# Patient Record
Sex: MALE | Race: WHITE
[De-identification: names, ages, dates, MRNs, and addresses within clinical notes are randomized per-mention and may not be internally consistent; named-entity substitution may affect disease eponyms.]

---

## 2018-05-06 ENCOUNTER — HOSPITAL ENCOUNTER (INPATIENT)
Dept: HOSPITAL 36 - ER | Age: 68
LOS: 16 days | Discharge: NURSING, REHAB OR LONG-TERM CARE | DRG: 885 | End: 2018-05-22
Attending: PSYCHIATRY & NEUROLOGY | Admitting: PSYCHIATRY & NEUROLOGY
Payer: MEDICARE

## 2018-05-06 VITALS — DIASTOLIC BLOOD PRESSURE: 76 MMHG | SYSTOLIC BLOOD PRESSURE: 124 MMHG

## 2018-05-06 DIAGNOSIS — I10: ICD-10-CM

## 2018-05-06 DIAGNOSIS — F79: ICD-10-CM

## 2018-05-06 DIAGNOSIS — E87.6: ICD-10-CM

## 2018-05-06 DIAGNOSIS — Z91.81: ICD-10-CM

## 2018-05-06 DIAGNOSIS — E03.9: ICD-10-CM

## 2018-05-06 DIAGNOSIS — F02.80: ICD-10-CM

## 2018-05-06 DIAGNOSIS — F29: Primary | ICD-10-CM

## 2018-05-06 DIAGNOSIS — G30.9: ICD-10-CM

## 2018-05-06 DIAGNOSIS — M19.90: ICD-10-CM

## 2018-05-06 LAB
ALBUMIN SERPL-MCNC: 4.1 GM/DL (ref 4.2–5.5)
ALBUMIN/GLOB SERPL: 1.6 {RATIO} (ref 1–1.8)
ALP SERPL-CCNC: 112 U/L (ref 34–104)
ALT SERPL-CCNC: 12 U/L (ref 7–52)
ANION GAP SERPL CALC-SCNC: 12.9 MMOL/L (ref 7–16)
APPEARANCE UR: CLEAR
AST SERPL-CCNC: 12 U/L (ref 13–39)
BACTERIA #/AREA URNS HPF: (no result) /HPF
BASOPHILS # BLD AUTO: 0.1 TH/CUMM (ref 0–0.2)
BASOPHILS NFR BLD AUTO: 0.6 % (ref 0–2)
BILIRUB SERPL-MCNC: 0.5 MG/DL (ref 0.3–1)
BILIRUB UR-MCNC: NEGATIVE MG/DL
BUN SERPL-MCNC: 13 MG/DL (ref 7–25)
CALCIUM SERPL-MCNC: 9.2 MG/DL (ref 8.6–10.3)
CHLORIDE SERPL-SCNC: 100 MEQ/L (ref 98–107)
CO2 SERPL-SCNC: 21.4 MEQ/L (ref 21–31)
COLOR UR: (no result)
CREAT SERPL-MCNC: 0.9 MG/DL (ref 0.7–1.3)
EOSINOPHIL # BLD AUTO: 0.1 TH/CMM (ref 0.1–0.4)
EOSINOPHIL NFR BLD AUTO: 0.9 % (ref 0–5)
EPI CELLS URNS QL MICRO: (no result) /LPF
ERYTHROCYTE [DISTWIDTH] IN BLOOD BY AUTOMATED COUNT: 12.9 % (ref 11.5–20)
GLOBULIN SER-MCNC: 2.5 GM/DL
GLUCOSE SERPL-MCNC: 107 MG/DL (ref 70–105)
GLUCOSE UR STRIP-MCNC: NEGATIVE MG/DL
HCT VFR BLD CALC: 43.1 % (ref 41–60)
HGB BLD-MCNC: 14.5 GM/DL (ref 12–16)
INR PPP: 0.95 (ref 0.5–1.4)
KETONES UR STRIP-MCNC: NEGATIVE MG/DL
LEUKOCYTE ESTERASE UR-ACNC: NEGATIVE
LYMPHOCYTE AB SER FC-ACNC: 2.2 TH/CMM (ref 1.5–3)
LYMPHOCYTES NFR BLD AUTO: 22 % (ref 20–50)
MCH RBC QN AUTO: 29.4 PG (ref 27–31)
MCHC RBC AUTO-ENTMCNC: 33.6 PG (ref 28–36)
MCV RBC AUTO: 87.6 FL (ref 80–99)
MICRO URNS: YES
MONOCYTES # BLD AUTO: 0.7 TH/CMM (ref 0.3–1)
MONOCYTES NFR BLD AUTO: 6.5 % (ref 2–10)
NEUTROPHILS # BLD: 7.1 TH/CMM (ref 1.8–8)
NEUTROPHILS NFR BLD AUTO: 70 % (ref 40–80)
NITRITE UR QL STRIP: NEGATIVE
PH UR STRIP: 6 [PH] (ref 4.6–8)
PLATELET # BLD: 339 TH/CMM (ref 150–400)
PMV BLD AUTO: 8 FL
POTASSIUM SERPL-SCNC: 3.3 MEQ/L (ref 3.5–5.1)
PROT UR STRIP-MCNC: NEGATIVE MG/DL
PROTHROMBIN TIME: 9.9 SECONDS (ref 9.5–11.5)
RBC # BLD AUTO: 4.92 MIL/CMM (ref 3.8–5.8)
RBC # UR STRIP: (no result) /UL
RBC #/AREA URNS HPF: (no result) /HPF (ref 0–5)
SODIUM SERPL-SCNC: 131 MEQ/L (ref 136–145)
SP GR UR STRIP: <= 1.005 (ref 1–1.03)
URINALYSIS COMPLETE PNL UR: (no result)
UROBILINOGEN UR STRIP-ACNC: 0.2 E.U./DL (ref 0.2–1)
WBC # BLD AUTO: 10.2 TH/CMM (ref 4.8–10.8)
WBC #/AREA URNS HPF: (no result) /HPF (ref 0–5)

## 2018-05-06 PROCEDURE — Z7610: HCPCS

## 2018-05-06 PROCEDURE — G0410 GRP PSYCH PARTIAL HOSP 45-50: HCPCS

## 2018-05-07 RX ADMIN — FEXOFENADINE HYDROCHLORIDE SCH: 60 TABLET, FILM COATED ORAL at 22:38

## 2018-05-07 RX ADMIN — FLUTICASONE PROPIONATE SCH: 50 SPRAY, METERED NASAL at 09:35

## 2018-05-07 RX ADMIN — LEVOTHYROXINE SODIUM SCH MG: 75 TABLET ORAL at 06:40

## 2018-05-07 NOTE — HISTORY & PHYSICAL
ADMIT DATE:  05/07/2018



HISTORY SOURCE:  Reviewing the chart, talking to the staff.



HISTORY OF PRESENT ILLNESS:  A 68-year-old male admitted to Geropsych Unit at

[facility] after the patient was noted to have increasing

agitation, acute in onset.  The patient was also hitting his head with his

hands.  The patient did not have any apparent injury.  Does have underlying

diagnosis of dementia.  The patient was worked up in the ER and now subsequently

admitted to the hospital for further care.



PAST MEDICAL HISTORY:  Remarkable for:

1.  Alzheimer dementia.

2.  Allergic rhinosinusitis.

3.  Hypothyroidism.

4.  DJD.

5.  Hypertension.

6.  Psychotic disorder.

7.  History of anemia.



MEDICATIONS AT HOME:  He is taking p.r.n. albuterol inhaler, Norvasc, Cogentin,

ferrous sulfate, Allegra, Flonase, Robitussin, Motrin, Synthroid, Ativan, milk

of magnesia, multivitamin, Seroquel, Diovan, and Ambien.



ALLERGIES:  The patient is not allergic to medication.



SOCIAL HISTORY:  The patient resides in assisted living facility.  The patient

has no history of any smoking cigarette, alcohol or drug use.



FAMILY MEDICAL HISTORY:  Unknown.



REVIEW OF SYSTEMS:  Unable to get meaningful history from the patient.



PHYSICAL EXAMINATION:

GENERAL:  A 68-year-old alert, awake, lying in the bed, unable to give any

meaningful history.

VITAL SIGNS:  Temperature 98, pulse is 79, respiratory rate is 18, blood

pressure 123/73.

HEENT:  Normocephalic, atraumatic.  Extraocular muscles are intact.  Tongue was

pink and coated.  No oral lesions.  No exudate.

NECK:  Supple, no JVD, no hepatojugular reflex.  No thyromegaly.  No carotid

bruit.

HEART:  Both heart sounds are regular.  No S3, no S4.

CHEST AND LUNGS:  Equal in expansion, no wheezing, no crackles.

ABDOMEN:  Soft.  No guarding, no rigidity.  Bowel sounds present.  No palpable

mass.

EXTREMITIES:  No edema, no cyanosis, no calf tenderness.  Diffuse osteoarthritic

changes noted.

NEUROLOGIC:  Alert, awake, but not following any commands.



AVAILABLE DIAGNOSTIC DATA:  White count of 10.2, hemoglobin of 14.5, platelet

count of 339.  PT, PTT are normal.  Sodium 131, potassium 3.3, chloride 100, CO2

21.4, BUN and creatinine is 13 and 0.9.  Urinalysis is unremarkable.  EKG has

underlying left bundle branch block.



CLINICAL IMPRESSION:

1.  Acute exacerbation of psychotic disorder.

2.  Hypertension.

3.  Hypothyroidism.

4.  DJD.

5.  Dementia.

6.  Hypokalemia.

7.  High risk for fall.



PLAN:  [facility]  Geriatric psychiatric care

as per Dr. Coyle.  Resume the patient's medication for hypertension, Diovan and

Norvasc.  Continue Synthroid for hypothyroidism.  Continue to provide

symptomatic care with inhalation therapy p.r.n., Motrin for the pain, cough

syrup for cough along with nutritional support with multivitamin.  Fall

precautions will be given as well.  General nursing care will be provided. 

Nutritional support will be added.  Follow up lab will be done as well and will

continue to follow this patient [facility].



I sincerely thank you, Dr. Coyle for giving me the opportunity to participate in

the patient of yours.





DD: 05/07/2018 11:15

DT: 05/07/2018 12:22

JOB# [id number]  [id number]

## 2018-05-07 NOTE — PSYCHOSOCIAL EVALUATION
DATE OF SERVICE:  



PSYCHIATRIC INITIAL EVALUATION AND MENTAL STATUS EXAM



PATIENT'S AGE:  68.



SEX:  Male.



PHYSICIAN:  Abner Coyle MD, MPH.



CHIEF COMPLAINT:  Agitation and irritability.



HISTORY OF PRESENT ILLNESS:  The patient is a 68-year-old male who is living in

[facility].  The patient has been extremely agitated

and irritable in the nursing home and he has been hitting himself and hitting

staff and other patients in the group home where he lives.  The patient also is

developmentally disabled and he belongs to [facility].  He was not able to

follow any of staff directions.  Upon arrival into the hospital, the patient was

extremely irritable and agitated and tried to get out of the Emergency Room and

became violent and he has to be given emergency medications to calm him down. 

The patient is still extremely irritable and agitated.  During my interview, the

patient also was actively responding and he was shouting and waving his hands

inappropriately.  He also was in angry and in irritable mood.



PAST PSYCHIATRIC HISTORY:  The patient has history of developmentally disabled

and dementia.



PAST MEDICAL HISTORY:  The patient has no major medical problems.



SOCIAL HISTORY:  The patient lives in [facility].  No

known alcohol or drug use.



ALLERGIES:  No known allergies.



MENTAL STATUS EXAMINATION:  The patient appeared age.  Anxious.  Irritable mood.

 Flat affect.  Thought processes are disorganized.  The patient did not answer

questions regarding hallucinations or delusions because of his inability to

answer questions coherently.  He actively responded to stimuli.  He is also

easily agitated.  He seems to be of moderate mental retardation, based on his

verbal ability and on observations.



ASSESSMENT:

PRIMARY DIAGNOSIS:  Unspecified psychosis.



SECONDARY DIAGNOSIS:  Moderate, mental disability.



TREATMENT PLAN:  Continue to monitor his behavior closely.  Also, we will start

Seroquel.  We will discontinue Risperdal.  We will monitor behavior.



ESTIMATED LENGTH OF STAY:  5-7 days.



THE PATIENT'S STRENGTHS AND WEAKNESSES:  The patient has support from the group

home.  Weakness is his poor impulse control.



AFTER DISCHARGE PLAN:  The patient will return to [facility] with plans for

outpatient treatment.





DD: 05/07/2018 08:01

DT: 05/07/2018 10:03

[facility]iatric# [id number]  [id number]

## 2018-05-08 LAB
ALBUMIN SERPL-MCNC: 3.8 GM/DL (ref 4.2–5.5)
ALBUMIN/GLOB SERPL: 1.5 {RATIO} (ref 1–1.8)
ALP SERPL-CCNC: 105 U/L (ref 34–104)
ALT SERPL-CCNC: 15 U/L (ref 7–52)
ANION GAP SERPL CALC-SCNC: 10.7 MMOL/L (ref 7–16)
AST SERPL-CCNC: 20 U/L (ref 13–39)
BILIRUB SERPL-MCNC: 0.5 MG/DL (ref 0.3–1)
BUN SERPL-MCNC: 13 MG/DL (ref 7–25)
CALCIUM SERPL-MCNC: 8.9 MG/DL (ref 8.6–10.3)
CHLORIDE SERPL-SCNC: 102 MEQ/L (ref 98–107)
CO2 SERPL-SCNC: 23.9 MEQ/L (ref 21–31)
CREAT SERPL-MCNC: 0.8 MG/DL (ref 0.7–1.3)
GLOBULIN SER-MCNC: 2.5 GM/DL
GLUCOSE SERPL-MCNC: 117 MG/DL (ref 70–105)
POTASSIUM SERPL-SCNC: 3.6 MEQ/L (ref 3.5–5.1)
SODIUM SERPL-SCNC: 133 MEQ/L (ref 136–145)

## 2018-05-08 RX ADMIN — FEXOFENADINE HYDROCHLORIDE SCH MG: 60 TABLET, FILM COATED ORAL at 21:00

## 2018-05-08 RX ADMIN — FLUTICASONE PROPIONATE SCH SPR: 50 SPRAY, METERED NASAL at 09:36

## 2018-05-08 RX ADMIN — LEVOTHYROXINE SODIUM SCH MG: 75 TABLET ORAL at 06:41

## 2018-05-08 RX ADMIN — FEXOFENADINE HYDROCHLORIDE SCH MG: 60 TABLET, FILM COATED ORAL at 09:35

## 2018-05-09 RX ADMIN — FEXOFENADINE HYDROCHLORIDE SCH MG: 60 TABLET, FILM COATED ORAL at 09:55

## 2018-05-09 RX ADMIN — LEVOTHYROXINE SODIUM SCH MG: 75 TABLET ORAL at 06:38

## 2018-05-09 RX ADMIN — FEXOFENADINE HYDROCHLORIDE SCH MG: 60 TABLET, FILM COATED ORAL at 20:13

## 2018-05-09 RX ADMIN — FLUTICASONE PROPIONATE SCH SPR: 50 SPRAY, METERED NASAL at 09:57

## 2018-05-09 NOTE — PROGRESS NOTES
DATE:  05/08/2018



SUBJECTIVE:  Chart reviewed and the patient interviewed.  Also discussed the

patient's condition with the staff and reviewed records and labs.  The patient

is still extremely irritable and anxious.  The patient also is still easily

agitated.  Also, wandering around the unit confused.  The patient also is

actively responding and is talking to self and laughing inappropriately.  Also,

unable to answer any of the questions coherently.  On the other hand, he seems

to be slightly calmer than yesterday.



ASSESSMENT:  The patient is still agitated and psychotic.



TREATMENT PLAN:  Continue monitoring his behavior and his condition closely. 

Also, continue Seroquel same dose and we will continue to follow up.





DD: 05/08/2018 20:11

DT: 05/09/2018 16:34

[facility]# [id number]  [id number]

## 2018-05-09 NOTE — PROGRESS NOTES
DATE:  05/08/2018



SUBJECTIVE:  The patient seen and examined.  The patient is lying in the bed. 

The patient is nonverbal.  Discussed with nursing staff about a 24-hour event. 

The patient has been eating fairly well.  The patient trying to communicate, but

was unable to understand.



PHYSICAL EXAMINATION:

VITAL SIGNS:  Temperature 97, pulse is 76, respiratory rate is 18, blood

pressure 130/89.

HEENT:  No facial asymmetry.

NECK:  Supple, no JVD.

HEART:  Regular.

CHEST:  Equal in expansion, no wheezing, no crackles.

ABDOMEN:  Soft.

EXTREMITIES:  No edema.



AVAILABLE DIAGNOSTIC DATA:  Sodium 133, potassium 3.6, chloride 102, CO2 of

23.9, BUN and creatinine is 13 and 0.8, glucose of 117, alkaline phosphatase of

105, albumin of 3.8.



CLINICAL IMPRESSION:

1.  Alzheimer's type dementia.

2.  Psychotic disorder.

3.  Hypertension.

4.  Hypothyroidism.

5.  Degenerative joint disease.

6.  Psychotic disorder.

7.  Hypokalemia, resolved.



PLAN:

1.  Psych medications and follow up by psychiatrist.

2.  Continue Synthroid.

3.  Monitor blood pressure.

4.  Antihypertensive medicine.

5.  Fall precautions.

6.  General nursing care.

7.  Care plan reviewed and discussed with staff.





DD: 05/08/2018 09:23

DT: 05/09/2018 04:28

JOB# [id number]  [id number]

## 2018-05-10 RX ADMIN — FLUTICASONE PROPIONATE SCH SPR: 50 SPRAY, METERED NASAL at 08:37

## 2018-05-10 RX ADMIN — LEVOTHYROXINE SODIUM SCH MG: 75 TABLET ORAL at 06:35

## 2018-05-10 RX ADMIN — FEXOFENADINE HYDROCHLORIDE SCH MG: 60 TABLET, FILM COATED ORAL at 08:38

## 2018-05-10 RX ADMIN — FEXOFENADINE HYDROCHLORIDE SCH: 60 TABLET, FILM COATED ORAL at 20:45

## 2018-05-10 NOTE — PROGRESS NOTES
DATE:  



IDENTIFICATION:  A 68-year-old male.



SUBJECTIVE:  The patient was seen and examined.  The patient is lying in the

bed, unable to get meaningful history from the patient.  The patient continued

to remain agitated.  Psych medication has been adjusted by psychiatrist.



PHYSICAL EXAMINATION:

VITAL SIGNS:  On today's exam, temperature 97.4, pulse 80, respiratory is 18,

blood pressure 108/68.

HEENT:  No facial asymmetry.

NECK:  Supple, no JVD.

HEART:  Regular, no murmur.

CHEST:  Equal in expansion, no wheezing, no crackles.

ABDOMEN:  Soft.  No guarding, rigidity.  Bowel sounds heard.  No palpable mass.

EXTREMITIES:  No edema, no cyanosis.

NEUROLOGIC:  Unable to assess the patient's mini mental status examination 0/30.



CLINICAL IMPRESSION:

1.  Alzheimer's type dementia.

2.  Psychotic disorder with exacerbation.

3.  Hypertension.

4.  Hypothyroidism.

5.  Degenerative joint disease.

6.  High risk for fall.



PLAN:

1.  Psych medications and adjust medication adjustment per psychiatrist.

2.  Fall precautions.

3.  Antihypertensive medicine.

4.  Synthroid.

5.  General nursing care.

6.  Nutritional support.

7.  We will continue to follow this patient during his stay in the hospital.





DD: 05/10/2018 09:21

DT: 05/10/2018 21:52

JOB# [id number]  [id number]

## 2018-05-11 RX ADMIN — LEVOTHYROXINE SODIUM SCH MG: 75 TABLET ORAL at 06:43

## 2018-05-11 RX ADMIN — FEXOFENADINE HYDROCHLORIDE SCH: 60 TABLET, FILM COATED ORAL at 20:17

## 2018-05-11 RX ADMIN — FEXOFENADINE HYDROCHLORIDE SCH: 60 TABLET, FILM COATED ORAL at 09:01

## 2018-05-11 RX ADMIN — FLUTICASONE PROPIONATE SCH SPR: 50 SPRAY, METERED NASAL at 08:59

## 2018-05-11 NOTE — PROGRESS NOTES
DATE:  05/09/2018



SUBJECTIVE:  Chart reviewed and the patient interviewed.  Also discussed the

patient's condition with the staff and reviewed records and labs.  The patient

is still agitated and still has episodes of irritability and anger.  The patient

also is still wandering around the unit and with unpredictable behavior and

confused.  The patient also still needs lots of redirection with difficulty

following directions because he has developmental disability.  Otherwise, the

patient is compliant with taking his medications with no side effects of

medications.



ASSESSMENT:  The patient is agitated and still psychotic.



TREATMENT PLAN:  We will increase Seroquel to 50 mg in the morning and 75 mg at

bedtime.  Also, continue to work on behavioral modification and monitor his

behavior closely.





DD: 05/10/2018 20:34

DT: 05/11/2018 09:47

[facility]# [id number]  [id number]

## 2018-05-11 NOTE — PROGRESS NOTES
DATE:  



SUBJECTIVE:  Chart reviewed and the patient interviewed.  Also discussed the

patient's condition with the staff and reviewed records and labs.  The patient

is still confused and is still agitated.  The patient also still needs lots of

redirections with difficulty following directions because of his developmental

disability.  He also is still disheveled and personal hygiene is still poor. 

Otherwise, the patient is compliant with taking his medications with no side

effects of medications.



ASSESSMENT:  The patient is still psychotic and agitated.



TREATMENT PLAN:  Continue to monitor his behavior and his condition closely and

continue adjusting psychotropic medications and followup.





DD: 05/10/2018 21:16

DT: 05/11/2018 10:09

JOB# [id number]  [id number]

## 2018-05-11 NOTE — PROGRESS NOTES
DATE:  



IDENTIFICATION:  This is a 68-year-old male.



SUBJECTIVE:  The patient was seen and examined.  The patient is lying in the

bed, does not provide any meaningful history, discussed with nursing staff, no

new event.



OBJECTIVE:

VITAL SIGNS:  Temperature 97, pulse is 72, respiratory rate is 18, blood

pressure 127/75.

HEENT:  No facial asymmetry.

NECK:  Supple, no JVD.

HEART:  Regular.

CHEST:  Equal in expansion, no wheezing, no crackles.

ABDOMEN:  Soft.  No guarding, rigidity.  Bowel sounds present.  No palpable

mass.

EXTREMITIES:  No edema.



CLINICAL IMPRESSION:

1.  Alzheimer's dementia.

2.  Hypertension.

3.  Hypothyroidism.

4.  Degenerative joint disease.

5.  Psychotic disorder exacerbation.

6.  High risk for fall.



PLAN:

1.  Dementia medication.

2.  Psych medication.

3.  Psych followup.

4.  Monitor blood pressure.

5.  Monitor behavior.

6.  Antihypertensive medicine.

7.  Synthroid.

8.  Fall precaution.

9.  General nursing care.

10.  Nutritional support.

11.  [facility]





DD: 05/11/2018 09:17

DT: 05/11/2018 21:23

JOB# [id number]  [id number]

## 2018-05-11 NOTE — PROGRESS NOTES
DATE:  05/11/2018



SUBJECTIVE:  Case was discussed with staff of the patient and reviewed records. 

Covering for Dr. Coyle.  This is a 68-year-old male who was admitted on

05/06/2018.  He came from [facility].  He has been

extremely agitated and irritable in the nursing home, has been hitting himself

and hitting the staff and other patients in the group home where he is living. 

He has also been mentally disabled.  He belongs to a [facility] where he

was not able to staff direction.  On arrival to the hospital, the patient was

pacing, irritable, agitated, tried to get out of the Emergency Room and became

violent and has been given emergency medication to calm him down.  He was

shouting, waving his hands inappropriately, angry, irritable.  The patient also

with a history of dementia.  The patient continues to need a lot of redirection.

 Continues to be very confused, agitated, unable to carry on a conversation or

make safe plan for self-care.  Still looking disheveled, disorganized,

internally preoccupied; however, he is compliant with the medications and his

current medications include albuterol inhaler 2.5 mg every 4 hours and

amlodipine 5 mg daily, Cogentin 2 mg daily.  He is on codeine, guaifenesin 7.5

mg every 8 hours as needed.  He is also on Allegra 60 mg every 12 hours and

fluticasone 2 sprays in the nose daily.  He is on levothyroxine 0.15 mg daily,

Ativan 0.5 mg tablet as needed, multivitamin 1 tablet daily, Seroquel 0.75 mg at

bedtime and 50 mg in the morning, Diovan 160 mg daily with no side effects, no

sedation, no nausea, no extrapyramidal symptoms.  He is a high fall risk because

of the medication he takes, unstable, and his dementia; and we will continue to

work the patient in group therapy, milieu therapy, adjust medications as needed.





DD: 05/11/2018 11:33

DT: 05/11/2018 23:28

JOB# [id number]  [id number]

## 2018-05-12 RX ADMIN — FLUTICASONE PROPIONATE SCH SPR: 50 SPRAY, METERED NASAL at 10:08

## 2018-05-12 RX ADMIN — LEVOTHYROXINE SODIUM SCH MG: 75 TABLET ORAL at 06:35

## 2018-05-12 RX ADMIN — FEXOFENADINE HYDROCHLORIDE SCH MG: 60 TABLET, FILM COATED ORAL at 09:43

## 2018-05-12 RX ADMIN — FEXOFENADINE HYDROCHLORIDE SCH MG: 60 TABLET, FILM COATED ORAL at 20:20

## 2018-05-12 NOTE — PROGRESS NOTES
DATE:  05/12/2018



This is Dr. Louis covering for Dr. Coyle.



IDENTIFYING DATA:  A 68-year-old male with a history of severe developmental

delay.  He had been aggressive when he first came in.  Today on face-to-face

evaluation, the patient is nonverbal, smiles, sits down, does not provide much

more information beyond that.



MENTAL STATUS EXAMINATION:  Disheveled, disorganized, internally preoccupied.



CURRENT MEDICATIONS:  The patient is on Norvasc 5, Cogentin, Allegra, Flonase,

Motrin, Synthroid, Ativan as needed, Seroquel 50 mg p.o. every day and 75 at

night time, Diovan and Ambien as needed.



ASSESSMENT AND PLAN:  A 68-year-old male who observed to be continued internally

preoccupied, unpredictable, needed some redirection.  His medication continues

to build up a steady stable.  Continue with current medication, treatment plan

and goals.





DD: 05/12/2018 13:07

DT: 05/12/2018 22:02

JOB# [id number]  [id number]

## 2018-05-13 RX ADMIN — LEVOTHYROXINE SODIUM SCH MG: 75 TABLET ORAL at 06:37

## 2018-05-13 RX ADMIN — FEXOFENADINE HYDROCHLORIDE SCH MG: 60 TABLET, FILM COATED ORAL at 21:02

## 2018-05-13 RX ADMIN — FLUTICASONE PROPIONATE SCH SPR: 50 SPRAY, METERED NASAL at 09:18

## 2018-05-13 RX ADMIN — FEXOFENADINE HYDROCHLORIDE SCH MG: 60 TABLET, FILM COATED ORAL at 09:16

## 2018-05-14 RX ADMIN — FLUTICASONE PROPIONATE SCH SPR: 50 SPRAY, METERED NASAL at 10:11

## 2018-05-14 RX ADMIN — LEVOTHYROXINE SODIUM SCH MG: 75 TABLET ORAL at 06:33

## 2018-05-14 RX ADMIN — FEXOFENADINE HYDROCHLORIDE SCH MG: 60 TABLET, FILM COATED ORAL at 10:10

## 2018-05-14 RX ADMIN — FEXOFENADINE HYDROCHLORIDE SCH MG: 60 TABLET, FILM COATED ORAL at 21:26

## 2018-05-14 NOTE — PROGRESS NOTES
DATE:  



SUBJECTIVE:  The patient was seen, chart reviewed and discussed with staff.  The

patient continues to be nonverbal, generally answers questions with nods and

shakes his head, but sometimes can contradict to himself.  According to staff,

he has been taking his medications.  Generally been redirectable.



PLAN:  The patient continues to be internally preoccupied, unpredictable, so

that he will require inpatient care center treatment.  We will monitor patient

on a daily basis for response to medication and titrate meds as needed.





DD: 05/13/2018 12:34

DT: 05/14/2018 03:37

JOB# [id number]  [id number]

## 2018-05-14 NOTE — PROGRESS NOTES
DATE:  05/14/2018



Case was discussed with staff of the patient, reviewed records.  The patient

continues to be confused, unable to participate in a meaningful conversation

that basically nonverbal, trying to make movements with his hands when I talked

to him.  Continues to be unpredictable, impulsive, internally preoccupied.  He

has been compliant with the medication with no side effects, no sedation, no

nausea, no extrapyramidal symptoms.  We will continue to work with the patient. 

No change in medications since 05/11/2018.  We will continue outpatient group

therapy, milieu therapy, and adjust the medications as needed.





DD: 05/14/2018 12:32

DT: 05/14/2018 23:24

JOB# [id number]  [id number]

## 2018-05-15 RX ADMIN — FLUTICASONE PROPIONATE SCH SPR: 50 SPRAY, METERED NASAL at 08:41

## 2018-05-15 RX ADMIN — FEXOFENADINE HYDROCHLORIDE SCH MG: 60 TABLET, FILM COATED ORAL at 08:41

## 2018-05-15 RX ADMIN — FEXOFENADINE HYDROCHLORIDE SCH MG: 60 TABLET, FILM COATED ORAL at 21:22

## 2018-05-15 RX ADMIN — LEVOTHYROXINE SODIUM SCH MG: 75 TABLET ORAL at 06:34

## 2018-05-15 NOTE — PROGRESS NOTES
DATE:  05/15/2018



Covering for Dr. Coyle.



Case discussed with staff of the patient and reviewed records.  The patient

continues to be unable to communicate.  Continues to be unpredictable,

impulsive, needing redirection.  Continues to be unable to make safe plan for

self-care and no side effects to the medication, no sedation, no nausea and we

will continue to work with the patient in group therapy, milieu therapy, and

adjust medications.





DD: 05/15/2018 12:17

DT: 05/15/2018 21:23

JOB# [id number]  [id number]

## 2018-05-15 NOTE — PROGRESS NOTES
DATE:  05/14/2018



SUBJECTIVE:  The patient seen and examined.  The patient is lying in the bed,

unable to get meaningful history from the patient.  The patient is lying in the

bed without any distress.



PHYSICAL EXAMINATION:

VITAL SIGNS:  Temperature 98.2, pulse 71, respiratory 18, blood pressure 130/75.

HEENT:  No facial asymmetry.

NECK:  Supple, no JVD.

HEART:  Regular.

CHEST:  Lung equal in expansion, no wheezing, no crackles.

ABDOMEN:  Soft.  No guarding, rigidity.  Bowel sounds heard.  No palpable mass.

EXTREMITIES:  No edema.



CLINICAL IMPRESSION:

1.  Alzheimer dementia.

2.  Hypertension.

3.  Hypothyroidism.

4.  Degenerative joint disease.

5.  Psychotic disorder exacerbation.

6.  High risk for fall.



PLAN:

1.  Psych medication.

2.  Psych followup.

3.  Monitor blood pressure.

4.  Antihypertensive medicine.

5.  Synthroid.

6.  Fall precaution.

7.  General nursing care.

8.  Nutritional support.

9.  We will continue to follow this patient during his stay in the hospital.





DD: 05/14/2018 09:25

DT: 05/15/2018 01:23

JOB# [id number]  [id number]

## 2018-05-16 RX ADMIN — FEXOFENADINE HYDROCHLORIDE SCH MG: 60 TABLET, FILM COATED ORAL at 21:33

## 2018-05-16 RX ADMIN — LEVOTHYROXINE SODIUM SCH MG: 75 TABLET ORAL at 06:40

## 2018-05-16 RX ADMIN — FLUTICASONE PROPIONATE SCH SPR: 50 SPRAY, METERED NASAL at 09:51

## 2018-05-16 RX ADMIN — FEXOFENADINE HYDROCHLORIDE SCH MG: 60 TABLET, FILM COATED ORAL at 09:51

## 2018-05-16 NOTE — PROGRESS NOTES
DATE:  



The patient is a 68-year-old male.



SUBJECTIVE:  The patient seen and examined.  The patient is sitting in the

chair.  No new event and unable to get meaningful history from the patient. 

Discussed with nursing staff about their concern.



PHYSICAL EXAMINATION:

VITAL SIGNS:  Temperature 97, pulse is 64, respiratory rate 18, and blood

pressure 144/70.

HEENT:  No facial asymmetry.  Poor dentition noted.  Pupil react to light. 

Tongue were pink and coated.

NECK:  Supple, no JVD, lymphadenopathy, thyromegaly.

HEART:  Regular, no murmur.

CHEST:  Lung equal in expansion.  No wheezing, no crackles.

ABDOMEN:  Soft, no guarding or rigidity.  Bowel sounds heard.  No palpable mass.

EXTREMITIES:  No edema, no cyanosis.

NEUROLOGIC:  Nonfocal.



CLINICAL IMPRESSION:

1.  Alzheimer dementia.

2.  Hypertension.

3.  Hyperlipidemia.

4.  Hypothyroidism.

5.  Degenerative joint disease.

6.  Psychotic disorder.

7.  High risk for fall.



PLAN:

1.  Some dementia and psych medication as per psychiatrist.

2.  Monitor blood pressure.

3.  Antihypertensive medicine.

4.  General nursing care.

5.  Nutritional support.

6.  Fall precaution.

7.  Chronic disease management.

8.  Symptoms management.

9.  Medication management.

10.  We will continue to follow this patient during the stay in the hospital.





DD: 05/16/2018 11:17

DT: 05/16/2018 23:21

JOB# [id number]  [id number]

## 2018-05-17 RX ADMIN — FEXOFENADINE HYDROCHLORIDE SCH MG: 60 TABLET, FILM COATED ORAL at 09:17

## 2018-05-17 RX ADMIN — LEVOTHYROXINE SODIUM SCH MG: 75 TABLET ORAL at 06:45

## 2018-05-17 RX ADMIN — FEXOFENADINE HYDROCHLORIDE SCH MG: 60 TABLET, FILM COATED ORAL at 21:30

## 2018-05-17 RX ADMIN — FLUTICASONE PROPIONATE SCH SPR: 50 SPRAY, METERED NASAL at 09:13

## 2018-05-17 NOTE — PROGRESS NOTES
DATE:  05/16/2018



SUBJECTIVE:  Chart reviewed and the patient interviewed.  Also, I reviewed

records and labs.  The patient is still unpredictable and confused.  The patient

is pacing up and down the unit.  The patient also still needs lots of

redirections, but it seems to be slightly easier to redirect him.  Also,

decreased self-destructive behavior.  The patient continued to comply with

taking his medications with no side effects of medications.



ASSESSMENT:  The patient is less psychotic and less agitated, but still needs

lots of redirections.



TREATMENT PLAN:  Continue monitoring his behavior and his condition closely. 

Also, continue working on poor impulse control and behavior modification.  Also,

planning to talk to [facility]  to check if they will take the

patient back when he is having better impulse control and complete the

adjustment of his psychotropic medications.





DD: 05/16/2018 15:16

DT: 05/17/2018 01:23

JOB# [id number]  [id number]

## 2018-05-17 NOTE — PROGRESS NOTES
DATE:  



SUBJECTIVE:  Chart reviewed and the patient interviewed.  Also discussed the

patient's condition with the staff and reviewed the records and labs.  The

patient continue to be confused.  The patient also is restless and is still

easily agitated.  The patient also needs lots of redirections and he is still

having difficulty with redirections.  Otherwise, the patient is compliant with

taking his medications with no side effects of medications.



ASSESSMENT:  The patient is still confused and agitated.



TREATMENT PLAN:  We will continue monitoring his behavior and his condition

closely and work on behavioral modifications.  Also, we will increase Seroquel

to 50 mg in the morning and 100 mg at bedtime and we will continue to follow up.





DD: 05/17/2018 07:25

DT: 05/17/2018 23:50

JOB# [id number]  [id number]

## 2018-05-18 RX ADMIN — FEXOFENADINE HYDROCHLORIDE SCH MG: 60 TABLET, FILM COATED ORAL at 21:01

## 2018-05-18 RX ADMIN — FLUTICASONE PROPIONATE SCH: 50 SPRAY, METERED NASAL at 11:03

## 2018-05-18 RX ADMIN — FEXOFENADINE HYDROCHLORIDE SCH: 60 TABLET, FILM COATED ORAL at 11:04

## 2018-05-18 RX ADMIN — LEVOTHYROXINE SODIUM SCH MG: 75 TABLET ORAL at 06:33

## 2018-05-18 NOTE — PROGRESS NOTES
DATE:  05/18/2018



SUBJECTIVE:  Chart reviewed and the patient interviewed.  Also discussed the

patient's condition with the staff and reviewed records and labs.  The patient

is confused and is still easily agitated and irritable.  The patient also is

still trying to grab items from other patients and from staff.  Also, easily

agitated and is in angry and irritable mood and he has difficulty following

directions, although it seems to be slightly easier to follow directions.  The

patient is compliant with taking his medications with no side effects of

medications.



ASSESSMENT:  The patient is still confused and needs close monitoring.



TREATMENT PLAN:  Continue to monitor his behavior and condition closely.  Also,

continue to work on his irritability and agitation as well as an effective

coping.  Also, I increased Seroquel yesterday and we will continue to monitor

the dose.





DD: 05/18/2018 07:21

DT: 05/18/2018 21:53

JOB# [id number]  [id number]

## 2018-05-19 RX ADMIN — FLUTICASONE PROPIONATE SCH SPR: 50 SPRAY, METERED NASAL at 08:35

## 2018-05-19 RX ADMIN — FEXOFENADINE HYDROCHLORIDE SCH MG: 60 TABLET, FILM COATED ORAL at 08:36

## 2018-05-19 RX ADMIN — LEVOTHYROXINE SODIUM SCH MG: 75 TABLET ORAL at 06:50

## 2018-05-19 RX ADMIN — FEXOFENADINE HYDROCHLORIDE SCH MG: 60 TABLET, FILM COATED ORAL at 21:00

## 2018-05-19 NOTE — PROGRESS NOTES
DATE:  



SUBJECTIVE:  The patient is a 68-year-old male.  The patient is seen and

examined, unable to get meaningful history from the patient.  The patient

remained hemodynamically stable.



OBJECTIVE:

VITAL SIGNS:  Temperature 97, pulse is 74, respiratory rate 18, blood pressure

120/70.

HEENT:  No facial asymmetry.

NECK:  Supple, no JVD.

HEART:  Regular.

CHEST:  Lung equal in expansion.  No wheezing, no crackles.

ABDOMEN:  Soft.  No guarding, no rigidity.  Bowel sounds are present.  No

palpable mass.

EXTREMITIES:  No edema.

NEUROLOGIC:  Alert, awake, trying to follow commands.



CLINICAL IMPRESSION:

1.  Psychotic disorder exacerbation.

2.  Hypertension.

3.  Alzheimer's dementia.

4.  Hypothyroidism.

5.  Degenerative joint disease.

6.  High risk for fall.



PLAN:

1.  Psych medication.

2.  Psych followup.

3.  Monitor blood pressure.

4.  Monitor behavior.

5.  Antihypertensive medication.

6.  Synthroid.

7.  General nursing care.

8.  Nutritional support.

9.  Fall precautions.

10.  Care plan reviewed and discussed with staff.





DD: 05/18/2018 09:31

DT: 05/19/2018 00:22

JOB# [id number]  [id number]

## 2018-05-20 RX ADMIN — FLUTICASONE PROPIONATE SCH SPR: 50 SPRAY, METERED NASAL at 08:57

## 2018-05-20 RX ADMIN — FEXOFENADINE HYDROCHLORIDE SCH MG: 60 TABLET, FILM COATED ORAL at 20:45

## 2018-05-20 RX ADMIN — FEXOFENADINE HYDROCHLORIDE SCH MG: 60 TABLET, FILM COATED ORAL at 08:57

## 2018-05-20 RX ADMIN — LEVOTHYROXINE SODIUM SCH MG: 75 TABLET ORAL at 06:48

## 2018-05-20 NOTE — PROGRESS NOTES
DATE:  05/20/2018



IDENTIFICATION:  A 68-year-old male.



SUBJECTIVE:  The patient seen and examined.  The patient is nonverbal, unable to

get meaningful history from the patient.  Discussed with nursing staff for

further care.



PHYSICAL EXAMINATION:

VITAL SIGNS:  Temperature 98, pulse is 64, respiratory rate 18, blood pressure

144/70.

HEENT:  Poor dentition.  No facial asymmetry.

NECK:  Supple, no JVD.

HEART:  Regular.

CHEST AND LUNGS:  Equal in expansion, no wheezing, no crackles.

ABDOMEN:  Soft.  No guarding, rigidity.  Bowel sounds are present.  No palpable

mass.

EXTREMITIES:  No edema.



CLINICAL IMPRESSION:

1.  Psychotic disorder.

2.  Hypertension.

3.  Alzheimer's dementia.

4.  Hypothyroidism.

5.  Degenerative joint disease.



PLAN:

1.  Psych medication.

2.  Psych followup.

3.  Monitor blood pressure.

4.  Monitor behavior.

5.  Synthroid.

6.  General nursing care.

7.  Fall precaution.

8.  Chronic disease management.

9.  Symptoms management.

10.  Care plan reviewed and discussed with staff.





DD: 05/20/2018 15:15

DT: 05/20/2018 18:24

JOB# [id number]  [id number]

## 2018-05-20 NOTE — PROGRESS NOTES
DATE:  05/20/2018



Covering for Dr. Coyle.



Case was discussed with staff of the patient, reviewed records.  The patient

continues to be unable to care for himself.  Continues to have poor insight. 

Continues to need redirection, unpredictable, and impulsive.  Continues to be

easily agitated, irritable, unpredictable, and impulsive.  He is compliant with

the medication with no side effect, sleeping better, and eating better.  No side

effects with the medication, no sedation, no nausea, and no extrapyramidal

symptoms.  We will continue to work with the patient in group therapy, milieu

therapy, and adjust the medications as needed.





DD: 05/20/2018 11:53

DT: 05/20/2018 21:17

JOB# [id number]  [id number]

## 2018-05-21 RX ADMIN — FEXOFENADINE HYDROCHLORIDE SCH MG: 60 TABLET, FILM COATED ORAL at 09:24

## 2018-05-21 RX ADMIN — FEXOFENADINE HYDROCHLORIDE SCH MG: 60 TABLET, FILM COATED ORAL at 20:45

## 2018-05-21 RX ADMIN — LEVOTHYROXINE SODIUM SCH MG: 75 TABLET ORAL at 06:43

## 2018-05-21 RX ADMIN — FLUTICASONE PROPIONATE SCH SPR: 50 SPRAY, METERED NASAL at 09:23

## 2018-05-21 NOTE — PROGRESS NOTES
DATE:  05/21/2018



SUBJECTIVE:  The patient seen and examined.  The patient is lying in the bed. 

According to nursing staff, the patient is to going to be discharged to ____

Alzheimer's unit.  The patient does not provide a meaningful history.  The

patient remained hemodynamically stable.



Medication list has been reviewed, which has been noted.  No significant changes

for medications from the medical standpoint.  



PHYSICAL EXAMINATION:

VITAL SIGNS:  Blood pressure in my today is 111/69, pulse is 63, respiratory

rate 19, and temperature 97.2.

HEENT:  Poor dentition.

NECK:  Supple, no JVD.

HEART:  Regular.

CHEST:  Lung equal in expansion.

LUNGS:  No wheezing, no crackles.

ABDOMEN:  Soft.

EXTREMITIES:  No edema.



CLINICAL IMPRESSION:

1.  Hypertension.

2.  Psychotic disorder.

3.  Dementia.

4.  Hypothyroidism.

5.  Degenerative joint disease.

6.  Allergic rhinosinusitis.

7.  History of asthma and chronic obstructive pulmonary disease.



PLAN:  Continue to use albuterol inhaler as needed.  Continue Norvasc and Diovan

as prescribed.  Continue ProAir.  Allegra and Flonase for allergic

rhinosinusitis and levothyroxine for the hypothyroidism.  The patient's psych

medication and psych followup as per psychiatrist.  The patient is medically

stable to be discharged to lower level of care.





DD: 05/21/2018 09:36

DT: 05/21/2018 18:31

JOB# [id number]  [id number]

## 2018-05-22 RX ADMIN — LEVOTHYROXINE SODIUM SCH MG: 75 TABLET ORAL at 06:50

## 2018-05-22 NOTE — PROGRESS NOTES
DATE:  



SUBJECTIVE:  The patient is going home today.  The patient's board and care ____

asking for medication to be continued at home.  The patient does not provide a

meaningful history.



PHYSICAL EXAMINATION:

VITAL SIGNS:  Temperature 97.3, pulse is 51, respiratory rate 18, and blood

pressure 128/88.

HEENT:  No facial asymmetry.

NECK:  Supple, no JVD.

HEART:  Regular.

CHEST:  Lung equal in expansion.

LUNGS:  No wheezing, no crackles.

ABDOMEN:  Soft.

EXTREMITIES:  No edema.



CLINICAL IMPRESSION:

1.  Hypertension.

2.  Hypothyroidism.

3.  Degenerative joint disease.

4.  Dementia.

5.  Psychotic disorder.

6.  Insomnia.



PLAN:  Write prescription for a patient's hypertension and hypothyroidism

medications.  The patient to have continuation of psych medication as the

patient receiving by the psychiatrist.  The patient is stable to be discharged

back to board and care facility.





DD: 05/22/2018 09:20

DT: 05/22/2018 16:44

JOB# [id number]  [id number]

## 2018-05-22 NOTE — DISCHARGE SUMMARY
DATE OF DISCHARGE:  



I dictated his discharge summary yesterday, but for some reason, the patient was

not able to be discharged and the patient discharged today.  Please change the

discharge summary date from yesterday to today and no other changes.





DD: 05/22/2018 11:57

DT: 05/22/2018 16:15

JOB# [id number]  [id number]

## 2018-05-22 NOTE — PROGRESS NOTES
DATE:  05/21/2018



PSYCHIATRIC PROGRESS NOTE



SUBJECTIVE:  Chart reviewed and the patient interviewed.  Also discussed the

patient's condition with the staff and reviewed records and labs.  The patient

is calmer and he is less irritable and less agitated.  Slightly easier to

redirect him.  He is still having episodes of trying to steal food and coffee

from other patients but seems to be slightly less than before and slightly

easier to redirect him.  Otherwise, the patient is compliant with taking his

medications with no side effects of medications.



ASSESSMENT:  The patient is less psychotic and less agitated.



TREATMENT PLAN:  Continue to monitor his behavior, but planning to discharge the

patient today, if the discharge plan has already completed by discharge

coordinator.





DD: 05/22/2018 06:42

DT: 05/22/2018 10:47

[facility]# [id number]  [id number]

## 2018-09-03 ENCOUNTER — HOSPITAL ENCOUNTER (EMERGENCY)
Dept: HOSPITAL 36 - ER | Age: 68
Discharge: HOME | End: 2018-09-03
Payer: MEDICARE

## 2018-09-03 DIAGNOSIS — R45.1: Primary | ICD-10-CM

## 2018-09-03 DIAGNOSIS — E07.9: ICD-10-CM

## 2018-09-03 DIAGNOSIS — I10: ICD-10-CM

## 2018-09-03 DIAGNOSIS — R80.9: ICD-10-CM

## 2018-09-03 DIAGNOSIS — E87.6: ICD-10-CM

## 2018-09-03 LAB
ALBUMIN SERPL-MCNC: 3.9 GM/DL (ref 4.2–5.5)
ALBUMIN/GLOB SERPL: 1.4 {RATIO} (ref 1–1.8)
ALP SERPL-CCNC: 113 U/L (ref 34–104)
ALT SERPL-CCNC: 9 U/L (ref 7–52)
ANION GAP SERPL CALC-SCNC: 9.7 MMOL/L (ref 7–16)
APPEARANCE UR: (no result)
AST SERPL-CCNC: 12 U/L (ref 13–39)
BACTERIA #/AREA URNS HPF: (no result) /HPF
BASOPHILS # BLD AUTO: 0.3 TH/CUMM (ref 0–0.2)
BASOPHILS NFR BLD AUTO: 2.8 % (ref 0–2)
BILIRUB SERPL-MCNC: 0.3 MG/DL (ref 0.3–1)
BILIRUB UR-MCNC: NEGATIVE MG/DL
BUN SERPL-MCNC: 13 MG/DL (ref 7–25)
CALCIUM SERPL-MCNC: 9.1 MG/DL (ref 8.6–10.3)
CHLORIDE SERPL-SCNC: 100 MEQ/L (ref 98–107)
CO2 SERPL-SCNC: 27.2 MEQ/L (ref 21–31)
COLOR UR: YELLOW
CREAT SERPL-MCNC: 0.8 MG/DL (ref 0.7–1.3)
EOSINOPHIL # BLD AUTO: 0.3 TH/CMM (ref 0.1–0.4)
EOSINOPHIL NFR BLD AUTO: 3.1 % (ref 0–5)
EPI CELLS URNS QL MICRO: (no result) /LPF
ERYTHROCYTE [DISTWIDTH] IN BLOOD BY AUTOMATED COUNT: 12.3 % (ref 11.5–20)
GLOBULIN SER-MCNC: 2.8 GM/DL
GLUCOSE SERPL-MCNC: 138 MG/DL (ref 70–105)
GLUCOSE UR STRIP-MCNC: NEGATIVE MG/DL
HCT VFR BLD CALC: 43.5 % (ref 41–60)
HGB BLD-MCNC: 14.7 GM/DL (ref 12–16)
KETONES UR STRIP-MCNC: NEGATIVE MG/DL
LEUKOCYTE ESTERASE UR-ACNC: NEGATIVE
LYMPHOCYTE AB SER FC-ACNC: 3.1 TH/CMM (ref 1.5–3)
LYMPHOCYTES NFR BLD AUTO: 30.5 % (ref 20–50)
MAGNESIUM SERPL-MCNC: 2.2 MG/DL (ref 1.9–2.7)
MCH RBC QN AUTO: 28.9 PG (ref 27–31)
MCHC RBC AUTO-ENTMCNC: 33.9 PG (ref 28–36)
MCV RBC AUTO: 85.3 FL (ref 80–99)
MICRO URNS: YES
MONOCYTES # BLD AUTO: 0.8 TH/CMM (ref 0.3–1)
MONOCYTES NFR BLD AUTO: 8 % (ref 2–10)
NEUTROPHILS # BLD: 5.7 TH/CMM (ref 1.8–8)
NEUTROPHILS NFR BLD AUTO: 55.6 % (ref 40–80)
NITRITE UR QL STRIP: NEGATIVE
PH UR STRIP: 6.5 [PH] (ref 4.6–8)
PHOSPHATE SERPL-MCNC: 3.2 MG/DL (ref 2.5–5)
PLATELET # BLD: 337 TH/CMM (ref 150–400)
PMV BLD AUTO: 7.9 FL
POTASSIUM SERPL-SCNC: 2.9 MEQ/L (ref 3.5–5.1)
PROT UR STRIP-MCNC: 100 MG/DL
RBC # BLD AUTO: 5.1 MIL/CMM (ref 3.8–5.8)
RBC # UR STRIP: (no result) /UL
RBC #/AREA URNS HPF: (no result) /HPF (ref 0–5)
SODIUM SERPL-SCNC: 134 MEQ/L (ref 136–145)
SP GR UR STRIP: 1.02 (ref 1–1.03)
URINALYSIS COMPLETE PNL UR: (no result)
UROBILINOGEN UR STRIP-ACNC: 0.2 E.U./DL (ref 0.2–1)
WBC # BLD AUTO: 10.2 TH/CMM (ref 4.8–10.8)
WBC #/AREA URNS HPF: (no result) /HPF (ref 0–5)

## 2018-09-03 PROCEDURE — 80053 COMPREHEN METABOLIC PANEL: CPT

## 2018-09-03 PROCEDURE — 83735 ASSAY OF MAGNESIUM: CPT

## 2018-09-03 PROCEDURE — Z7502: HCPCS

## 2018-09-03 PROCEDURE — 84443 ASSAY THYROID STIM HORMONE: CPT

## 2018-09-03 PROCEDURE — 81001 URINALYSIS AUTO W/SCOPE: CPT

## 2018-09-03 PROCEDURE — 36415 COLL VENOUS BLD VENIPUNCTURE: CPT

## 2018-09-03 PROCEDURE — 84100 ASSAY OF PHOSPHORUS: CPT

## 2018-09-03 PROCEDURE — 96376 TX/PRO/DX INJ SAME DRUG ADON: CPT

## 2018-09-03 PROCEDURE — 85025 COMPLETE CBC W/AUTO DIFF WBC: CPT

## 2018-09-03 PROCEDURE — 96374 THER/PROPH/DIAG INJ IV PUSH: CPT

## 2018-09-03 PROCEDURE — 99284 EMERGENCY DEPT VISIT MOD MDM: CPT

## 2018-09-03 NOTE — ED PHYSICIAN CHART
ED Chief Complaint/HPI





- Patient Information


Date Seen:: 09/03/18


Time Seen:: 20:40


Chief Complaint:: increased agitation


History of Present Illness:: 


increased agitation---lives at a home.





We have no geropsych beds here.


Allergies:: 


 Allergies











Allergy/AdvReac Type Severity Reaction Status Date / Time


 


No Known Allergies Allergy   Verified 09/03/18 20:52











Vitals:: 


 Vital Signs - 8 hr











  09/03/18





  20:40


 


Temp 98.1 F


 





 


RR 18


 


/95


 


O2 Sat % 95














ED Review of Systems





- Review of Systems


General/Constitutional: No fever, No chills, No weight loss, No weakness, No 

diaphoresis, No edema, No loss of appetite


Skin: No skin lesions, No rash, No bruising


Head: No headache, No light-headedness


Eyes: No loss of vision, No pain, No diplopia


ENT: No earache, No nasal drainage, No sore throat, No tinnitus


Neck: No neck pain, No swelling, No thyromegaly, No stiffness, No mass noted


Cardio Vascular: No chest pain, No palpitations, No PND, No orthopnea, No edema


Pulmonary: No SOB, No cough, No sputum, No wheezing


GI: No nausea, No vomiting, No diarrhea, No pain, No melena, No hematochezia, 

No constipation, No hematemesis


G/U: No dysuria, No frequency, No hematuria


Musculoskeletal: No bone or joint pain, No back pain, No muscle pain


Endocrine: No polyuria, No polydipsia


Psychiatric: Other (increased agitation.)


Hematopoietic: No bruising, No lymphadenopathy


Allergic/Immuno: No urticaria, No angioedema


Neurological: No syncope, No focal symptoms, No weakness, No paresthesia, No 

headache, No seizure, No dizziness, No confusion, No vertigo





ED Past Medical History





- Past Medical History


Obtainable: No


Past Medical History: HTN, Thyroid disorder, Dementia, Other (caretaker gave us 

his past medical history:  hypokalemia, Alzheimer's and intellectual 

disabilities)





Family Medical History





- Family Member


  ** Mother


History Unknown: Yes


Ethnicity: Unknown


Living Status: Unknown


Hx Family Cancer:  (unknown)


Hx Family Coronary Artery Disease:  (unknown)


Hx Family Congestive Heart Failure:  (unknown)


Hx Family Hypertension:  (unknown)


Hx Family Stroke:  (unknown)


Hx Family Diabetes:  (unknown)


Hx Family Seizures:  (unknown)


Hx Family Dementia:  (unknown)


Hx Family AIDS:  (unknown)


Hx Family COPD:  (unknown)


Hx Family Hepatitis:  (unknown)


Hx Family Psychiatric Problems:  (unknown)


Hx Family Tuberculosis:  (unknown)





ED Physical Exam





- Physical Examination


General/Constitutional: Awake, Well-developed, well-nourished, Alert, No 

distress, Non-toxic appearing, Ambulatory


Other Gen/Cons comments:: 





nonverbal except for sounds.


Head: Atraumatic


Eyes: Lids, conjuctiva normal, PERRL, EOMI


Other Skin comments:: 


dry oral mucosa.


ENMT: External ears, nose nl, Nasal exam nl


Neck: Nontender, Full ROM w/o pain, No JVD, No nuchal rigidity, No bruit, No 

mass, No stridor


Respiratory: Nl effort/Exclusion, Clear to Auscultation, No Wheeze/Rhonchi/Rales


Cardio Vascular: RRR, No murmur, gallop, rubs, NL S1 S2


GI: No tenderness/rebounding/guarding, No organomegaly, No hernia, Normal BS's, 

Nondistended, No mass/bruits, No McBurney tenderness


: No CVA tenderness


Extremities: No tenderness or effusion, Full ROM, normal strength in all 

extremities, No edema, Normal digits & nails


Neuro/Psych: Normal sensory exam, Normal motor strength, Mood normal, Normal 

gait, No focal deficits


Other Neuro/Psych comments:: 


blunted affect.


Misc: Normal back, No paraspinal tenderness





ED Labs/Radiology/EKG Results





- Lab Results


Results: 


 Laboratory Tests











  09/03/18 09/03/18 09/03/18





  21:00 21:15 21:15


 


WBC   10.2 


 


RBC   5.10 


 


Hgb   14.7 


 


Hct   43.5 


 


MCV   85.3 


 


MCH   28.9 


 


MCHC Differential   33.9 


 


RDW   12.3 


 


Plt Count   337 


 


MPV   7.9 


 


Neutrophils %   55.6 


 


Lymphocytes %   30.5 


 


Monocytes %   8.0 


 


Eosinophils %   3.1 


 


Basophils %   2.8 H 


 


Sodium    134 L


 


Potassium    2.9 L*


 


Chloride    100


 


Carbon Dioxide    27.2


 


Anion Gap    9.7


 


BUN    13


 


Creatinine    0.8


 


Est GFR ( Amer)    > 60.0


 


Est GFR (Non-Af Amer)    > 60.0


 


BUN/Creatinine Ratio    16.3


 


Glucose    138 H


 


Calcium    9.1


 


Phosphorus    3.2


 


Magnesium    2.2


 


Total Bilirubin    0.3


 


AST    12 L


 


ALT    9


 


Alkaline Phosphatase    113 H


 


Total Protein    6.7


 


Albumin    3.9 L


 


Globulin    2.8


 


Albumin/Globulin Ratio    1.4


 


Urine Source  CLEAN C  


 


Urine Color  YELLOW  


 


Urine Clarity  HAZY  


 


Urine pH  6.5  


 


Ur Specific Gravity  1.025  


 


Urine Protein  100 H  


 


Urine Glucose (UA)  NEGATIVE  


 


Urine Ketones  NEGATIVE  


 


Urine Blood  TRACE  


 


Urine Nitrate  NEGATIVE  


 


Urine Bilirubin  NEGATIVE  


 


Urine Urobilinogen  0.2  


 


Ur Leukocyte Esterase  NEGATIVE  


 


Urine RBC  2-5 H  


 


Urine WBC  0-2  


 


Ur Epithelial Cells  MANY  


 


Urine Bacteria  FEW  


 


Urine Mucus  MODERATE  














ED Assessment





- Assessment


General Assessment: 


resting comfortably.





took oral potassium.


Assessment/Comments:: 


received Ativan while monitored. Caretaker pleased with repsonse and feels 

comfortable in taking him home.





ED Septic Shock





- .


Is Septic Shock (SBP<90, OR Lactate>4 mmol\L) present?: No





- <6hrs of presentation:


Vital Signs: 


 Vital Signs - 8 hr











  09/03/18





  20:40


 


Temp 98.1 F


 





 


RR 18


 


/95


 


O2 Sat % 95














ED Reassessment (Disposition)





- Reassessment


Reassessment Condition:: Improved





- Diagnosis


Diagnosis:: 





Agitation





Hypokalemia (low potassium)





Proteinuria





- Aftercare/Follow up Instructions


Medication Prescribed:: 





Potassium chloride 40 meq x 1 dose.





- Patient Disposition


Discharge/Transfer:: Residential/Boarding Care


Condition at Disposition:: Stable, Improved

## 2018-09-04 ENCOUNTER — HOSPITAL ENCOUNTER (EMERGENCY)
Dept: HOSPITAL 36 - ER | Age: 68
Discharge: HOME | End: 2018-09-04
Payer: MEDICARE

## 2018-09-04 DIAGNOSIS — E07.9: ICD-10-CM

## 2018-09-04 DIAGNOSIS — R94.31: ICD-10-CM

## 2018-09-04 DIAGNOSIS — R45.1: Primary | ICD-10-CM

## 2018-09-04 DIAGNOSIS — E87.6: ICD-10-CM

## 2018-09-04 LAB
ALBUMIN SERPL-MCNC: 3.9 GM/DL (ref 4.2–5.5)
ALBUMIN/GLOB SERPL: 1.4 {RATIO} (ref 1–1.8)
ALP SERPL-CCNC: 109 U/L (ref 34–104)
ALT SERPL-CCNC: 10 U/L (ref 7–52)
ANION GAP SERPL CALC-SCNC: 10.7 MMOL/L (ref 7–16)
AST SERPL-CCNC: 14 U/L (ref 13–39)
BASOPHILS # BLD AUTO: 0.1 TH/CUMM (ref 0–0.2)
BASOPHILS NFR BLD AUTO: 1.1 % (ref 0–2)
BILIRUB SERPL-MCNC: 0.3 MG/DL (ref 0.3–1)
BUN SERPL-MCNC: 15 MG/DL (ref 7–25)
CALCIUM SERPL-MCNC: 9.3 MG/DL (ref 8.6–10.3)
CHLORIDE SERPL-SCNC: 106 MEQ/L (ref 98–107)
CO2 SERPL-SCNC: 25.5 MEQ/L (ref 21–31)
CREAT SERPL-MCNC: 0.8 MG/DL (ref 0.7–1.3)
EOSINOPHIL # BLD AUTO: 0.3 TH/CMM (ref 0.1–0.4)
EOSINOPHIL NFR BLD AUTO: 3.1 % (ref 0–5)
ERYTHROCYTE [DISTWIDTH] IN BLOOD BY AUTOMATED COUNT: 12.6 % (ref 11.5–20)
GLOBULIN SER-MCNC: 2.7 GM/DL
GLUCOSE SERPL-MCNC: 112 MG/DL (ref 70–105)
HCT VFR BLD CALC: 42.4 % (ref 41–60)
HGB BLD-MCNC: 14.3 GM/DL (ref 12–16)
LYMPHOCYTE AB SER FC-ACNC: 2.4 TH/CMM (ref 1.5–3)
LYMPHOCYTES NFR BLD AUTO: 25.8 % (ref 20–50)
MCH RBC QN AUTO: 28.9 PG (ref 27–31)
MCHC RBC AUTO-ENTMCNC: 33.6 PG (ref 28–36)
MCV RBC AUTO: 85.9 FL (ref 80–99)
MONOCYTES # BLD AUTO: 0.8 TH/CMM (ref 0.3–1)
MONOCYTES NFR BLD AUTO: 8.4 % (ref 2–10)
NEUTROPHILS # BLD: 5.7 TH/CMM (ref 1.8–8)
NEUTROPHILS NFR BLD AUTO: 61.6 % (ref 40–80)
PLATELET # BLD: 334 TH/CMM (ref 150–400)
PMV BLD AUTO: 8.2 FL
POTASSIUM SERPL-SCNC: 3.2 MEQ/L (ref 3.5–5.1)
RBC # BLD AUTO: 4.94 MIL/CMM (ref 3.8–5.8)
SODIUM SERPL-SCNC: 139 MEQ/L (ref 136–145)
WBC # BLD AUTO: 9.3 TH/CMM (ref 4.8–10.8)

## 2018-09-04 PROCEDURE — 96372 THER/PROPH/DIAG INJ SC/IM: CPT

## 2018-09-04 PROCEDURE — 71045 X-RAY EXAM CHEST 1 VIEW: CPT

## 2018-09-04 PROCEDURE — 99285 EMERGENCY DEPT VISIT HI MDM: CPT

## 2018-09-04 PROCEDURE — 93005 ELECTROCARDIOGRAM TRACING: CPT

## 2018-09-04 PROCEDURE — 36415 COLL VENOUS BLD VENIPUNCTURE: CPT

## 2018-09-04 PROCEDURE — 85025 COMPLETE CBC W/AUTO DIFF WBC: CPT

## 2018-09-04 PROCEDURE — 87040 BLOOD CULTURE FOR BACTERIA: CPT

## 2018-09-04 PROCEDURE — 80053 COMPREHEN METABOLIC PANEL: CPT

## 2018-09-04 NOTE — ED PHYSICIAN CHART
ED Chief Complaint/HPI





- Patient Information


Date Seen:: 09/04/18


Time Seen:: 18:07


Chief Complaint:: agitation


History of Present Illness:: 





68 [place] old [place] with agitation here for geropsych eval severe 

intellectual disability self injuious behaviour here with sitter pt just makes 

sounds follows some commands


Allergies:: 


 Allergies











Allergy/AdvReac Type Severity Reaction Status Date / Time


 


No Known Allergies Allergy   Verified 09/03/18 20:52














ED Review of Systems





- Review of Systems


General/Constitutional: No fever


Skin: No skin lesions


Eyes: No loss of vision


ENT: No earache


Neck: No neck pain


Cardio Vascular: No chest pain


Pulmonary: No SOB


GI: No vomiting


Psychiatric: Prior psych history


Neurological: No syncope





ED Past Medical History





- Past Medical History


Past Medical History: Thyroid disorder, Other (severe intellectual disability 

and injurious behaviour to self)





Family Medical History





- Family Member


  ** Mother


History Unknown: Yes


Ethnicity: Unknown


Living Status: Unknown


Hx Family Cancer:  (unknown)


Hx Family Coronary Artery Disease:  (unknown)


Hx Family Congestive Heart Failure:  (unknown)


Hx Family Hypertension:  (unknown)


Hx Family Stroke:  (unknown)


Hx Family Diabetes:  (unknown)


Hx Family Seizures:  (unknown)


Hx Family Dementia:  (unknown)


Hx Family AIDS:  (unknown)


Hx Family COPD:  (unknown)


Hx Family Hepatitis:  (unknown)


Hx Family Psychiatric Problems:  (unknown)


Hx Family Tuberculosis:  (unknown)





ED Assessment





- Assessment


General Assessment: 





agitation worsening





ED Septic Shock





- .


Is Septic Shock (SBP<90, OR Lactate>4 mmol\L) present?: No





ED Reassessment (Disposition)





- Diagnosis


Diagnosis:: 





severe agitation





- Patient Disposition


Discharge/Transfer:: Acute Care w/in this hosp


Condition at Disposition:: Unchanged

## 2018-09-05 ENCOUNTER — HOSPITAL ENCOUNTER (EMERGENCY)
Dept: HOSPITAL 36 - ER | Age: 68
Discharge: HOME | End: 2018-09-05
Payer: MEDICARE

## 2018-09-05 DIAGNOSIS — F29: ICD-10-CM

## 2018-09-05 DIAGNOSIS — I10: ICD-10-CM

## 2018-09-05 DIAGNOSIS — E07.9: ICD-10-CM

## 2018-09-05 DIAGNOSIS — R45.1: ICD-10-CM

## 2018-09-05 DIAGNOSIS — F32.9: Primary | ICD-10-CM

## 2018-09-05 DIAGNOSIS — F03.90: ICD-10-CM

## 2018-09-05 LAB
ALBUMIN SERPL-MCNC: 3.8 GM/DL (ref 4.2–5.5)
ALBUMIN/GLOB SERPL: 1.4 {RATIO} (ref 1–1.8)
ALP SERPL-CCNC: 106 U/L (ref 34–104)
ALT SERPL-CCNC: 11 U/L (ref 7–52)
AMPHET UR-MCNC: NEGATIVE NG/ML
ANION GAP SERPL CALC-SCNC: 10.2 MMOL/L (ref 7–16)
APAP SERPL-MCNC: < 10 UG/ML (ref 10–30)
APPEARANCE UR: CLEAR
AST SERPL-CCNC: 14 U/L (ref 13–39)
BARBITURATES UR-MCNC: NEGATIVE UG/ML
BASOPHILS # BLD AUTO: 0.1 TH/CUMM (ref 0–0.2)
BASOPHILS NFR BLD AUTO: 1.1 % (ref 0–2)
BENZODIAZEPINES PNL UR: NEGATIVE
BILIRUB SERPL-MCNC: 0.5 MG/DL (ref 0.3–1)
BILIRUB UR-MCNC: NEGATIVE MG/DL
BUN SERPL-MCNC: 15 MG/DL (ref 7–25)
CALCIUM SERPL-MCNC: 9.2 MG/DL (ref 8.6–10.3)
CANNABINOIDS SERPL QL CFM: NEGATIVE
CHLORIDE SERPL-SCNC: 105 MEQ/L (ref 98–107)
CHOLEST SERPL-MCNC: 152 MG/DL (ref ?–200)
CO2 SERPL-SCNC: 25.5 MEQ/L (ref 21–31)
COCAINE METAB.OTHER UR-MCNC: NEGATIVE NG/ML
COLOR UR: YELLOW
CREAT SERPL-MCNC: 0.7 MG/DL (ref 0.7–1.3)
EOSINOPHIL # BLD AUTO: 0.4 TH/CMM (ref 0.1–0.4)
EOSINOPHIL NFR BLD AUTO: 3.8 % (ref 0–5)
ERYTHROCYTE [DISTWIDTH] IN BLOOD BY AUTOMATED COUNT: 12.6 % (ref 11.5–20)
GLOBULIN SER-MCNC: 2.8 GM/DL
GLUCOSE SERPL-MCNC: 113 MG/DL (ref 70–105)
GLUCOSE UR STRIP-MCNC: NEGATIVE MG/DL
HCT VFR BLD CALC: 42.4 % (ref 41–60)
HDLC SERPL-MCNC: 46 MG/DL (ref 23–92)
HGB BLD-MCNC: 14.4 GM/DL (ref 12–16)
KETONES UR STRIP-MCNC: NEGATIVE MG/DL
LEUKOCYTE ESTERASE UR-ACNC: NEGATIVE
LYMPHOCYTE AB SER FC-ACNC: 2.5 TH/CMM (ref 1.5–3)
LYMPHOCYTES NFR BLD AUTO: 24.5 % (ref 20–50)
MCH RBC QN AUTO: 29.2 PG (ref 27–31)
MCHC RBC AUTO-ENTMCNC: 33.9 PG (ref 28–36)
MCV RBC AUTO: 86 FL (ref 80–99)
METHADONE UR CFM-MCNC: NEGATIVE NG/ML
METHAMPHET UR QL: NEGATIVE
MICRO URNS: NO
MONOCYTES # BLD AUTO: 0.6 TH/CMM (ref 0.3–1)
MONOCYTES NFR BLD AUTO: 5.6 % (ref 2–10)
NEUTROPHILS # BLD: 6.4 TH/CMM (ref 1.8–8)
NEUTROPHILS NFR BLD AUTO: 65 % (ref 40–80)
NITRITE UR QL STRIP: NEGATIVE
OPIATES UR QL: NEGATIVE
PCP UR-MCNC: NEGATIVE UG/L
PH UR STRIP: 7 [PH] (ref 4.6–8)
PLATELET # BLD: 321 TH/CMM (ref 150–400)
PMV BLD AUTO: 8 FL
POTASSIUM SERPL-SCNC: 3.7 MEQ/L (ref 3.5–5.1)
PROT UR STRIP-MCNC: NEGATIVE MG/DL
RBC # BLD AUTO: 4.93 MIL/CMM (ref 3.8–5.8)
RBC # UR STRIP: NEGATIVE /UL
SALICYLATES SERPL-MCNC: < 25 MG/L (ref 30–100)
SODIUM SERPL-SCNC: 137 MEQ/L (ref 136–145)
SP GR UR STRIP: <= 1.005 (ref 1–1.03)
TRICYCLICS UR QL: POSITIVE
TRIGL SERPL-MCNC: 164 MG/DL (ref ?–150)
URINALYSIS COMPLETE PNL UR: (no result)
UROBILINOGEN UR STRIP-ACNC: 0.2 E.U./DL (ref 0.2–1)
WBC # BLD AUTO: 10 TH/CMM (ref 4.8–10.8)

## 2018-09-05 NOTE — ED PHYSICIAN CHART
ED Chief Complaint/HPI





- Patient Information


Date Seen:: 09/05/18


Time Seen:: 10:00


Chief Complaint:: Agitation


History of Present Illness:: 





onset x 3 days of agitation and hostile/aggressive behavior; no report of trauma

, H/As, S/T, neck pain, C/P, SOB, Abd. Pain, SIs, A/N/V/D/C, fever, chills, 

bleeding, or urinary s/s


Allergies:: 


 Allergies











Allergy/AdvReac Type Severity Reaction Status Date / Time


 


No Known Allergies Allergy   Verified 09/03/18 20:52











Historian:: Patient, EMS


Review:: Nurse's Note Reviewed, Old Chart Reviewed, EMS run form Reviewed





ED Review of Systems





- Review of Systems


General/Constitutional: No fever, No chills, No weight loss, No weakness, No 

diaphoresis, No edema, No loss of appetite


Skin: No skin lesions, No rash, No bruising


Head: No headache, No light-headedness


Eyes: No loss of vision, No pain, No diplopia


ENT: No earache, No nasal drainage, No sore throat, No tinnitus


Neck: No neck pain, No swelling, No thyromegaly, No stiffness, No mass noted


Cardio Vascular: No chest pain, No palpitations, No PND, No orthopnea, No edema


Pulmonary: No SOB, No cough, No sputum, No wheezing


GI: No nausea, No vomiting, No diarrhea, No pain, No melena, No hematochezia, 

No constipation, No hematemesis


G/U: No dysuria, No frequency, No hematuria, No nacturia


Musculoskeletal: No bone or joint pain, No back pain, No muscle pain


Endocrine: No polyuria, No polydipsia


Psychiatric: Prior psych history, Depression, No depression, Anxiety, No 

suicidal ideation, No homicidal ideation, No auditory hallucination, No visual 

hallucination


Hematopoietic: No bruising, No lymphadenopathy


Allergic/Immuno: No urticaria, No angioedema


Neurological: No syncope, No focal symptoms, No weakness, No paresthesia, No 

headache, No seizure, No dizziness, Confusion, No vertigo





ED Past Medical History





- Past Medical History


Obtainable: Yes


Past Medical History: HTN, Thyroid disorder, Dementia


Family History: HTN


Social History: Non Smoker, No Alcohol, No Drug Use, Single, Care Facility


Surgical History: None


Psychiatricy History: Depression, Bipolar, Dementia


Medication: Reviewed





Family Medical History





- Family Member


  ** Mother


History Unknown: Yes


Ethnicity: Unknown


Living Status: Unknown


Hx Family Cancer:  (unknown)


Hx Family Coronary Artery Disease:  (unknown)


Hx Family Congestive Heart Failure:  (unknown)


Hx Family Hypertension:  (unknown)


Hx Family Stroke:  (unknown)


Hx Family Diabetes:  (unknown)


Hx Family Seizures:  (unknown)


Hx Family Dementia:  (unknown)


Hx Family AIDS:  (unknown)


Hx Family COPD:  (unknown)


Hx Family Hepatitis:  (unknown)


Hx Family Psychiatric Problems:  (unknown)


Hx Family Tuberculosis:  (unknown)





ED Physical Exam





- Physical Examination


General/Constitutional: Awake, Well-developed, well-nourished, Alert, No 

distress, GCS 15, Non-toxic appearing, Ambulatory


Head: Atraumatic


Eyes: Lids, conjuctiva normal, PERRL, EOMI


Skin: Nl inspection, No rash, No skin lesions, No ecchymosis, Well hydrated, No 

lymphadenopathy


ENMT: External ears, nose nl, TM canals nl, Nasal exam nl, Lips, teeth, gums nl

, Oropharynx nl, Tonsils nl


Neck: Nontender, Full ROM w/o pain, No JVD, No nuchal rigidity, No bruit, No 

mass, No stridor


Respiratory: Nl effort/Exclusion, Clear to Auscultation, No Wheeze/Rhonchi/Rales


Cardio Vascular: RRR, No murmur, gallop, rubs, NL S1 S2, Carotid/Femoral/Distal 

pulses equal bilaterally


GI: No tenderness/rebounding/guarding, No organomegaly, No hernia, Normal BS's, 

Nondistended, No mass/bruits, No McBurney tenderness


: No CVA tenderness


Extremities: No tenderness or effusion, Full ROM, normal strength in all 

extremities, No edema, Normal digits & nails


Neuro/Psych: Alert/oriented, DTR's symmetric, Normal sensory exam, Normal motor 

strength, Judgement/insight normal, Mood normal, Normal gait, No focal deficits


Other Neuro/Psych comments:: 





+ Psychomotor Agitation; no SIs; Mood/Affect: Labile


Misc: Normal back, No paraspinal tenderness





ED Labs/Radiology/EKG Results





- Lab Results


Comments:: 





Reviewed





- EKG Interpretations


EKG Time:: 10:17


Rate & Rhythm: 80; NSR


Comments:: 





LBBB; non-specific st-t changes





ED Septic Shock





- .


Is Septic Shock (SBP<90, OR Lactate>4 mmol\L) present?: No





ED Reassessment (Disposition)





- Reassessment


Reassessment Condition:: Improved





- Diagnosis


Diagnosis:: 





Dx: Agitation; Psychosis; Dementia; Medical Clearance; Bipolar Disorder





- Aftercare/Follow up Instructions


Aftercare/Follow-Up Instructions:: Counseled pt regarding lab results/diagnosis 

& need follow up, Counseled pt & family regarding lab results/diagnosis & need 

follow up





- Patient Disposition


Discharge/Transfer:: Acute Care w/in this hosp


Admitted to:: [facility]


Condition at Disposition:: Stable, Improved

## 2018-09-05 NOTE — DIAGNOSTIC IMAGING REPORT
CHEST X-RAY: AP view



INDICATION: Shortness of breath



COMPARISON: None



FINDINGS: Mild congestive changes are seen.  No definite effusions.  No

focal consolidation identified.  Calcified granulomas are seen along the

right mediastinal region and probably the medial right lung base.  Mild

cardiomegaly is noted.  The osseous structures are intact.



IMPRESSION:



Mild congestive changes.  Developing interstitial infiltrates cannot be

excluded.



Mild cardiomegaly.  



Evidence of prior granulomatous disease.

## 2018-09-18 ENCOUNTER — HOSPITAL ENCOUNTER (EMERGENCY)
Dept: HOSPITAL 4 - SED | Age: 68
LOS: 1 days | Discharge: HOME | End: 2018-09-19
Payer: COMMERCIAL

## 2018-09-18 VITALS — WEIGHT: 186 LBS | BODY MASS INDEX: 25.19 KG/M2 | HEIGHT: 72 IN

## 2018-09-18 VITALS — SYSTOLIC BLOOD PRESSURE: 126 MMHG

## 2018-09-18 DIAGNOSIS — I10: ICD-10-CM

## 2018-09-18 DIAGNOSIS — E03.9: ICD-10-CM

## 2018-09-18 DIAGNOSIS — Z79.899: ICD-10-CM

## 2018-09-18 DIAGNOSIS — R51: Primary | ICD-10-CM

## 2018-09-18 PROCEDURE — 96372 THER/PROPH/DIAG INJ SC/IM: CPT

## 2018-09-18 PROCEDURE — 99283 EMERGENCY DEPT VISIT LOW MDM: CPT

## 2018-09-19 ENCOUNTER — HOSPITAL ENCOUNTER (INPATIENT)
Dept: HOSPITAL 36 - ER | Age: 68
LOS: 20 days | Discharge: HOME | DRG: 885 | End: 2018-10-09
Attending: PSYCHIATRY & NEUROLOGY | Admitting: PSYCHIATRY & NEUROLOGY
Payer: MEDICARE

## 2018-09-19 VITALS — SYSTOLIC BLOOD PRESSURE: 116 MMHG

## 2018-09-19 VITALS — DIASTOLIC BLOOD PRESSURE: 68 MMHG | SYSTOLIC BLOOD PRESSURE: 115 MMHG

## 2018-09-19 DIAGNOSIS — E03.9: ICD-10-CM

## 2018-09-19 DIAGNOSIS — F03.90: ICD-10-CM

## 2018-09-19 DIAGNOSIS — M19.90: ICD-10-CM

## 2018-09-19 DIAGNOSIS — R00.1: ICD-10-CM

## 2018-09-19 DIAGNOSIS — R13.10: ICD-10-CM

## 2018-09-19 DIAGNOSIS — I10: ICD-10-CM

## 2018-09-19 DIAGNOSIS — K21.9: ICD-10-CM

## 2018-09-19 DIAGNOSIS — J45.909: ICD-10-CM

## 2018-09-19 DIAGNOSIS — Z82.49: ICD-10-CM

## 2018-09-19 DIAGNOSIS — R62.50: ICD-10-CM

## 2018-09-19 DIAGNOSIS — F29: Primary | ICD-10-CM

## 2018-09-19 DIAGNOSIS — F72: ICD-10-CM

## 2018-09-19 LAB
ALBUMIN SERPL-MCNC: 3.9 GM/DL (ref 4.2–5.5)
ALBUMIN/GLOB SERPL: 1.6 {RATIO} (ref 1–1.8)
ALP SERPL-CCNC: 100 U/L (ref 34–104)
ALT SERPL-CCNC: 10 U/L (ref 7–52)
AMPHET UR-MCNC: NEGATIVE NG/ML
ANION GAP SERPL CALC-SCNC: 10.3 MMOL/L (ref 7–16)
APAP SERPL-MCNC: < 10 UG/ML (ref 10–30)
APPEARANCE UR: CLEAR
AST SERPL-CCNC: 16 U/L (ref 13–39)
BACTERIA #/AREA URNS HPF: (no result) /HPF
BARBITURATES UR-MCNC: NEGATIVE UG/ML
BASOPHILS # BLD AUTO: 0.1 TH/CUMM (ref 0–0.2)
BASOPHILS NFR BLD AUTO: 0.7 % (ref 0–2)
BENZODIAZEPINES PNL UR: NEGATIVE
BILIRUB SERPL-MCNC: 0.4 MG/DL (ref 0.3–1)
BILIRUB UR-MCNC: NEGATIVE MG/DL
BUN SERPL-MCNC: 14 MG/DL (ref 7–25)
CALCIUM SERPL-MCNC: 9 MG/DL (ref 8.6–10.3)
CANNABINOIDS SERPL QL CFM: NEGATIVE
CHLORIDE SERPL-SCNC: 108 MEQ/L (ref 98–107)
CHOLEST SERPL-MCNC: 131 MG/DL (ref ?–200)
CO2 SERPL-SCNC: 22.2 MEQ/L (ref 21–31)
COCAINE METAB.OTHER UR-MCNC: NEGATIVE NG/ML
COLOR UR: YELLOW
CREAT SERPL-MCNC: 0.8 MG/DL (ref 0.7–1.3)
EOSINOPHIL # BLD AUTO: 0.2 TH/CMM (ref 0.1–0.4)
EOSINOPHIL NFR BLD AUTO: 3.1 % (ref 0–5)
EPI CELLS URNS QL MICRO: (no result) /LPF
ERYTHROCYTE [DISTWIDTH] IN BLOOD BY AUTOMATED COUNT: 12.9 % (ref 11.5–20)
GLOBULIN SER-MCNC: 2.5 GM/DL
GLUCOSE SERPL-MCNC: 104 MG/DL (ref 70–105)
GLUCOSE UR STRIP-MCNC: NEGATIVE MG/DL
HCT VFR BLD CALC: 39.7 % (ref 41–60)
HDLC SERPL-MCNC: 43 MG/DL (ref 23–92)
HGB BLD-MCNC: 13.4 GM/DL (ref 12–16)
KETONES UR STRIP-MCNC: NEGATIVE MG/DL
LEUKOCYTE ESTERASE UR-ACNC: NEGATIVE
LYMPHOCYTE AB SER FC-ACNC: 2.1 TH/CMM (ref 1.5–3)
LYMPHOCYTES NFR BLD AUTO: 27 % (ref 20–50)
MCH RBC QN AUTO: 29.2 PG (ref 27–31)
MCHC RBC AUTO-ENTMCNC: 33.8 PG (ref 28–36)
MCV RBC AUTO: 86.3 FL (ref 80–99)
METHADONE UR CFM-MCNC: NEGATIVE NG/ML
METHAMPHET UR QL: NEGATIVE
MICRO URNS: YES
MONOCYTES # BLD AUTO: 0.6 TH/CMM (ref 0.3–1)
MONOCYTES NFR BLD AUTO: 8.3 % (ref 2–10)
NEUTROPHILS # BLD: 4.7 TH/CMM (ref 1.8–8)
NEUTROPHILS NFR BLD AUTO: 60.9 % (ref 40–80)
NITRITE UR QL STRIP: NEGATIVE
OPIATES UR QL: NEGATIVE
PCP UR-MCNC: NEGATIVE UG/L
PH UR STRIP: 6 [PH] (ref 4.6–8)
PLATELET # BLD: 289 TH/CMM (ref 150–400)
PMV BLD AUTO: 7.8 FL
POTASSIUM SERPL-SCNC: 3.5 MEQ/L (ref 3.5–5.1)
PROT UR STRIP-MCNC: NEGATIVE MG/DL
RBC # BLD AUTO: 4.6 MIL/CMM (ref 3.8–5.8)
RBC # UR STRIP: NEGATIVE /UL
RBC #/AREA URNS HPF: (no result) /HPF (ref 0–5)
SALICYLATES SERPL-MCNC: < 25 MG/L (ref 30–100)
SODIUM SERPL-SCNC: 137 MEQ/L (ref 136–145)
SP GR UR STRIP: <= 1.005 (ref 1–1.03)
TRICYCLICS UR QL: POSITIVE
TRIGL SERPL-MCNC: 76 MG/DL (ref ?–150)
URINALYSIS COMPLETE PNL UR: (no result)
UROBILINOGEN UR STRIP-ACNC: 0.2 E.U./DL (ref 0.2–1)
WBC # BLD AUTO: 7.7 TH/CMM (ref 4.8–10.8)
WBC #/AREA URNS HPF: (no result) /HPF (ref 0–5)

## 2018-09-19 PROCEDURE — Z7610: HCPCS

## 2018-09-19 NOTE — ED PHYSICIAN CHART
ED Chief Complaint/HPI





- Patient Information


Date Seen:: 09/19/18


Time Seen:: 16:50


Chief Complaint:: Agitation


History of Present Illness:: 





onset x 3 days of agitation, and hostile, aggressive behavior; no report of LOC

, ALOC, AMS, H/As, neck pain, trauma, C/P, SOB, Abd. pain, SIs, A/N/V/D/C, fever

, chills, or urinary s/s; pt's last tetanus shot: < 5 years; UTD


Allergies:: 


 Allergies











Allergy/AdvReac Type Severity Reaction Status Date / Time


 


No Known Allergies Allergy   Verified 09/19/18 17:00











Historian:: Patient, Friend


Review:: Nurse's Note Reviewed





<Joey Arguello - Last Filed: 09/19/18 17:57>





- Patient Information


Allergies:: 


 Allergies











Allergy/AdvReac Type Severity Reaction Status Date / Time


 


No Known Allergies Allergy   Verified 09/19/18 17:00











Vitals:: 


 Vital Signs - 8 hr











  09/19/18 09/19/18





  16:54 19:19


 


Temp 98.0 F 98.4 F


 


HR 80 75


 


RR 18 18


 


/82 120/74


 


O2 Sat % 95 96














<Bernardino Xavier - Last Filed: 09/19/18 19:29>





ED Review of Systems





- Review of Systems


General/Constitutional: No fever, No chills, No weight loss, No weakness, No 

diaphoresis, No edema, No loss of appetite


Skin: No skin lesions, No rash, No bruising


Head: No headache, No light-headedness


Eyes: No loss of vision, No pain, No diplopia


ENT: No earache, No nasal drainage, No sore throat, No tinnitus


Neck: No neck pain, No swelling, No thyromegaly, No stiffness, No mass noted


Cardio Vascular: No chest pain, No palpitations, No PND, No orthopnea, No edema


Pulmonary: No SOB, No cough, No sputum, No wheezing


GI: No nausea, No vomiting, No diarrhea, No pain, No melena, No hematochezia, 

No constipation, No hematemesis


G/U: No dysuria, No frequency, No hematuria, No nacturia


Musculoskeletal: No bone or joint pain, No back pain, No muscle pain


Endocrine: No polyuria, No polydipsia


Psychiatric: Prior psych history, Depression, Anxiety, No suicidal ideation, No 

homicidal ideation, No auditory hallucination, No visual hallucination


Hematopoietic: No bruising, No lymphadenopathy


Allergic/Immuno: No urticaria, No angioedema


Neurological: No syncope, No focal symptoms, No weakness, No paresthesia, No 

headache, No seizure, No dizziness, Confusion, No vertigo





<Joey Arguello - Last Filed: 09/19/18 17:57>





ED Past Medical History





- Past Medical History


Obtainable: Yes


Past Medical History: HTN, Thyroid disorder, Dementia


Family History: HTN


Social History: Non Smoker, No Alcohol, No Drug Use, Single, Care Facility


Surgical History: None


Psychiatricy History: Depression, Bipolar, Dementia


Medication: Reviewed





<Joey Arguello - Last Filed: 09/19/18 17:57>





Family Medical History





- Family Member


  ** Mother


History Unknown: Yes


Ethnicity: Unknown


Living Status: Unknown


Hx Family Cancer:  (unknown)


Hx Family Coronary Artery Disease:  (unknown)


Hx Family Congestive Heart Failure:  (unknown)


Hx Family Hypertension:  (unknown)


Hx Family Stroke:  (unknown)


Hx Family Diabetes:  (unknown)


Hx Family Seizures:  (unknown)


Hx Family Dementia:  (unknown)


Hx Family AIDS:  (unknown)


Hx Family COPD:  (unknown)


Hx Family Hepatitis:  (unknown)


Hx Family Psychiatric Problems:  (unknown)


Hx Family Tuberculosis:  (unknown)





<Joey Arguello - Last Filed: 09/19/18 17:57>





ED Physical Exam





- Physical Examination


General/Constitutional: Awake, Well-developed, well-nourished, Alert, No 

distress, GCS 15, Non-toxic appearing, Ambulatory


Head: Atraumatic


Eyes: Lids, conjuctiva normal, PERRL, EOMI


Skin: Nl inspection, No rash, No skin lesions, No ecchymosis, Well hydrated, No 

lymphadenopathy


ENMT: External ears, nose nl, TM canals nl, Nasal exam nl, Lips, teeth, gums nl

, Oropharynx nl, Tonsils nl


Neck: Nontender, Full ROM w/o pain, No JVD, No nuchal rigidity, No bruit, No 

mass, No stridor


Respiratory: Nl effort/Exclusion, Clear to Auscultation, No Wheeze/Rhonchi/Rales


Cardio Vascular: RRR, No murmur, gallop, rubs, NL S1 S2, Carotid/Femoral/Distal 

pulses equal bilaterally


GI: No tenderness/rebounding/guarding, No organomegaly, No hernia, Normal BS's, 

Nondistended, No mass/bruits, No McBurney tenderness


: No CVA tenderness


Extremities: No tenderness or effusion, Full ROM, normal strength in all 

extremities, No edema, Normal digits & nails


Neuro/Psych: Alert/oriented, DTR's symmetric, Normal sensory exam, Normal motor 

strength, Judgement/insight normal, Mood normal, Normal gait, No focal deficits


Other Neuro/Psych comments:: 





+ psychomotor Agitation; no SIs; mood/Affect: Labile


Misc: Normal back, No paraspinal tenderness





<Joey Arguello - Last Filed: 09/19/18 17:57>





ED Labs/Radiology/EKG Results





- Lab Results


Comments:: 





Reviewed





- EKG Interpretations


EKG Time:: 17:03


Rate & Rhythm: 73; NSR


Comments:: 





LBBB; non-specific st-t changes





<Joey Arguello - Last Filed: 09/19/18 17:57>





- Lab Results


Results: 


 Laboratory Tests











  09/19/18 09/19/18 09/19/18





  17:10 17:10 17:10


 


WBC  7.7  


 


RBC  4.60  


 


Hgb  13.4  


 


Hct  39.7 L  


 


MCV  86.3  


 


MCH  29.2  


 


MCHC Differential  33.8  


 


RDW  12.9  


 


Plt Count  289  


 


MPV  7.8  


 


Neutrophils %  60.9  


 


Lymphocytes %  27.0  


 


Monocytes %  8.3  


 


Eosinophils %  3.1  


 


Basophils %  0.7  


 


Sodium   137 


 


Potassium   3.5 


 


Chloride   108 H 


 


Carbon Dioxide   22.2 


 


Anion Gap   10.3 


 


BUN   14 


 


Creatinine   0.8 


 


Est GFR ( Amer)   > 60.0 


 


Est GFR (Non-Af Amer)   > 60.0 


 


BUN/Creatinine Ratio   17.5 


 


Glucose   104 


 


Calcium   9.0 


 


Total Bilirubin   0.4 


 


AST   16 


 


ALT   10 


 


Alkaline Phosphatase   100 


 


Troponin I    0.01


 


Total Protein   6.4 


 


Albumin   3.9 L 


 


Globulin   2.5 


 


Albumin/Globulin Ratio   1.6 


 


Triglycerides   76 


 


Cholesterol   131 


 


LDL Cholesterol Direct   72 L 


 


HDL Cholesterol   43 


 


Urine Source   


 


Urine Color   


 


Urine Clarity   


 


Urine pH   


 


Ur Specific Gravity   


 


Urine Protein   


 


Urine Glucose (UA)   


 


Urine Ketones   


 


Urine Blood   


 


Urine Nitrate   


 


Urine Bilirubin   


 


Urine Urobilinogen   


 


Ur Leukocyte Esterase   


 


Urine RBC   


 


Urine WBC   


 


Ur Epithelial Cells   


 


Urine Bacteria   


 


Urine Mucus   


 


Salicylates   < 25.0 L 


 


Acetaminophen   < 10.0 L 


 


Ethyl Alcohol   < 10 














  09/19/18





  18:50


 


WBC 


 


RBC 


 


Hgb 


 


Hct 


 


MCV 


 


MCH 


 


MCHC Differential 


 


RDW 


 


Plt Count 


 


MPV 


 


Neutrophils % 


 


Lymphocytes % 


 


Monocytes % 


 


Eosinophils % 


 


Basophils % 


 


Sodium 


 


Potassium 


 


Chloride 


 


Carbon Dioxide 


 


Anion Gap 


 


BUN 


 


Creatinine 


 


Est GFR ( Amer) 


 


Est GFR (Non-Af Amer) 


 


BUN/Creatinine Ratio 


 


Glucose 


 


Calcium 


 


Total Bilirubin 


 


AST 


 


ALT 


 


Alkaline Phosphatase 


 


Troponin I 


 


Total Protein 


 


Albumin 


 


Globulin 


 


Albumin/Globulin Ratio 


 


Triglycerides 


 


Cholesterol 


 


LDL Cholesterol Direct 


 


HDL Cholesterol 


 


Urine Source  CLEAN C


 


Urine Color  YELLOW


 


Urine Clarity  CLEAR


 


Urine pH  6.0


 


Ur Specific Gravity  <= 1.005


 


Urine Protein  NEGATIVE


 


Urine Glucose (UA)  NEGATIVE


 


Urine Ketones  NEGATIVE


 


Urine Blood  NEGATIVE


 


Urine Nitrate  NEGATIVE


 


Urine Bilirubin  NEGATIVE


 


Urine Urobilinogen  0.2


 


Ur Leukocyte Esterase  NEGATIVE


 


Urine RBC  0-2 H


 


Urine WBC  2-5


 


Ur Epithelial Cells  FEW


 


Urine Bacteria  1+ H


 


Urine Mucus  FEW


 


Salicylates 


 


Acetaminophen 


 


Ethyl Alcohol 














<Bernardino Xavier - Last Filed: 09/19/18 19:29>





ED Septic Shock





- .


Is Septic Shock (SBP<90, OR Lactate>4 mmol\L) present?: No





<Joey Arguello - Last Filed: 09/19/18 17:57>





- <6hrs of presentation:


Vital Signs: 


 Vital Signs - 8 hr











  09/19/18 09/19/18





  16:54 19:19


 


Temp 98.0 F 98.4 F


 


HR 80 75


 


RR 18 18


 


/82 120/74


 


O2 Sat % 95 96














<Bernardino Xavier - Last Filed: 09/19/18 19:29>





ED Reassessment (Disposition)





- Reassessment


Reassessment Condition:: Improved





- Diagnosis


Diagnosis:: 





Dx: Agitation; Psychosis; Dementia; Medical Clearance; Bipolar Disorder





- Aftercare/Follow up Instructions


Aftercare/Follow-Up Instructions:: Counseled pt regarding lab results/diagnosis 

& need follow up, Counseled pt & family regarding lab results/diagnosis & need 

follow up





<Joey Arguello - Last Filed: 09/19/18 17:57>





- Aftercare/Follow up Instructions


Notes:: 





pt is medically cleared pt placed on hold and admitted geropsych





- Patient Disposition


Discharge/Transfer:: Acute Care w/in this hosp


Condition at Disposition:: Stable





<Bernardino Xavier - Last Filed: 09/19/18 19:29>

## 2018-09-20 LAB
CHOLEST SERPL-MCNC: 136 MG/DL (ref ?–200)
HDLC SERPL-MCNC: 45 MG/DL (ref 23–92)
TRIGL SERPL-MCNC: 80 MG/DL (ref ?–150)

## 2018-09-20 RX ADMIN — LEVOTHYROXINE SODIUM SCH MG: 75 TABLET ORAL at 06:51

## 2018-09-20 RX ADMIN — FEXOFENADINE HYDROCHLORIDE SCH MG: 60 TABLET, FILM COATED ORAL at 18:04

## 2018-09-20 RX ADMIN — FEXOFENADINE HYDROCHLORIDE SCH: 60 TABLET, FILM COATED ORAL at 10:55

## 2018-09-20 RX ADMIN — FLUTICASONE PROPIONATE SCH: 50 SPRAY, METERED NASAL at 10:55

## 2018-09-20 NOTE — HISTORY & PHYSICAL
ADMIT DATE:  09/20/2018



PATIENT IDENTIFICATION:  A 68-year-old male.



HISTORY SOURCE:  Reviewing the chart, talking to staff.



CHIEF COMPLAINT:  Unable to obtain.



HISTORY OF PRESENT ILLNESS:  A 68-year-old  male who resides at

[facility] and assisted living facility for developmentally

disabled, brought in to the Emergency Room at [facility] after

patient was noted to have increasing agitation, hitting himself and try to hit

his head to the wall and the patient was evaluated in the Emergency Room, the

patient was advised to be admitted.  The patient does not provide any meaningful

history due to developmental disability with the dementia.



PAST MEDICAL HISTORY:  Remarkable for:

1.  Developmental disability.

2.  Dementia.

3.  Hypothyroidism.

4.  Hypertension.

5.  Psychotic disorder.

6.  DJD.

7.  Allergic rhinosinusitis.



MEDICATIONS AT HOME:  Taking Norvasc, Cogentin, Allegra, Flonase, Motrin,

Synthroid, Norvasc, Diovan, Seroquel, Ambien, multivitamin, milk of magnesia.



ALLERGIES:  The patient is not allergic to medications.



SOCIAL HISTORY:  The patient resides in an assisted living facility.  No history

of smoking cigarette, alcohol, or drug use.



FAMILY MEDICAL HISTORY:  Unknown.



REVIEW OF SYSTEMS:  Unable to get meaningful history from the patient.



PHYSICAL EXAMINATION:

GENERAL:  A 68-year-old, alert, awake, lying in the bed, unable to give any

history.

VITAL SIGNS:  Temperature 97.6, pulse is 84, respiratory rate is 18, blood

pressure 144/60.

HEENT:  Normocephalic, atraumatic.  Extraocular muscles intact.  Tongue was pink

and coated.  No oral lesions noted.  Multiple absent teeth noted.

NECK:  Supple.  No JVD, no hepatojugular reflux.  No lymphadenopathy,

thyromegaly or carotid bruit.

HEART:  Both heart sounds are regular.  No S3, no S4.

CHEST:  Lung equal in expansion, no expiratory wheezing.

ABDOMEN:  Soft.  No guarding, no rigidity.  Bowel sounds present.  No palpable

mass.

EXTREMITIES:  No edema, no cyanosis.  Peripheral pulses are +1.  No calf

tenderness noted.  Diffuse osteoarthritic changes involving upper and lower

extremity of major and minor joint noted.

NEUROLOGIC:  Alert, awake, but not following any simple commands.



AVAILABLE DIAGNOSTIC DATA:  White count of 7.7, hemoglobin 13.4, platelet count

of 289, BUN and creatinine 14 and 0.8, glucose of 104, potassium 3.5, albumin

3.9.  Cholesterol panel remarkable for cholesterol 131, LDL of 72, HDL of 43,

triglyceride of 76.  Urinalysis is negative.  Urine drug screen is positive for

tricyclic; otherwise unremarkable.



EKG has no ST-T changes representing acute ischemia.



CLINICAL IMPRESSION:

1.  Psychotic disorder exacerbation.

2.  Hypertension.

3.  Hypothyroidism.

4.  Degenerative joint disease.

5.  Developmental disability.

6.  Dementia.

7.  High risk for fall.



PLAN:

1.  Psychotic evaluation and management deferred to psychiatrist.

2.  Resume antihypertensive medicine.

3.  Hypertension, which include Norvasc and Cozaar.

4.  Synthroid for history of hypothyroidism.

5.  Continue Flonase and Allegra for allergic rhinosinusitis.

6.  Symptomatic therapy.

7.  Discontinue iron therapy considering there is no evidence of any anemia.

8.  P.r.n. inhalation therapy for asthma.

9.  Fall precautions.

10.  General nursing care.

11.  Psychotic evaluation and management deferred to psychiatrist.

12.  The patient will be followed by us during the stay in the hospital.



I sincerely thank you, Dr. Abner Coyle for giving me the opportunity to

participate in patient of yours.





DD: 09/20/2018 09:17

DT: 09/20/2018 09:54

JOB# [id number]  [id number]

## 2018-09-21 RX ADMIN — FEXOFENADINE HYDROCHLORIDE SCH MG: 60 TABLET, FILM COATED ORAL at 09:16

## 2018-09-21 RX ADMIN — FEXOFENADINE HYDROCHLORIDE SCH: 60 TABLET, FILM COATED ORAL at 18:00

## 2018-09-21 RX ADMIN — LEVOTHYROXINE SODIUM SCH MG: 75 TABLET ORAL at 06:40

## 2018-09-21 RX ADMIN — FLUTICASONE PROPIONATE SCH SPR: 50 SPRAY, METERED NASAL at 09:16

## 2018-09-21 NOTE — PSYCHIATRIC EVALUATION
DATE OF SERVICE:  09/21/2018



SUBJECTIVE:  The patient seen and examined.  The patient does not provide a

meaningful history.  The patient is lying in the bed with intermittent

aggressive behavior.



PHYSICAL EXAMINATION:

VITAL SIGNS:  Temperature 98, pulse is 74, respiratory rate 18, blood pressure

130/80.

HEENT:  No facial asymmetry.

NECK:  Supple, no JVD.

HEART:  Regular.

LUNGS:  Clear.

ABDOMEN:  Soft.

EXTREMITIES:  No edema.



CLINICAL IMPRESSION:  Developmental disability.



DIAGNOSTIC IMPRESSION:

1.  Psychotic disorder.

2.  Dementia.

3.  Hypothyroidism.

4.  Hypertension.

5.  Degenerative disk disease.

6.  History of asthma and allergic rhinosinusitis.



PLAN:

1.  Psychotic evaluation.  Management deferred to Psychiatry.

2.  Synthroid.

3.  Antihypertensive medicine.

4.  Monitor blood pressure.

5.  Nutritional support.

6.  General nursing care.

7.  Care plan reviewed and discussed with staff.





DD: 09/21/2018 09:22

DT: 09/21/2018 19:23

JOB# [id number]  [id number]

## 2018-09-22 RX ADMIN — FEXOFENADINE HYDROCHLORIDE SCH MG: 60 TABLET, FILM COATED ORAL at 08:46

## 2018-09-22 RX ADMIN — FLUTICASONE PROPIONATE SCH SPR: 50 SPRAY, METERED NASAL at 08:40

## 2018-09-22 RX ADMIN — LEVOTHYROXINE SODIUM SCH MG: 75 TABLET ORAL at 06:41

## 2018-09-23 RX ADMIN — FLUTICASONE PROPIONATE SCH SPR: 50 SPRAY, METERED NASAL at 09:57

## 2018-09-23 RX ADMIN — FEXOFENADINE HYDROCHLORIDE SCH MG: 60 TABLET, FILM COATED ORAL at 09:55

## 2018-09-23 RX ADMIN — FEXOFENADINE HYDROCHLORIDE SCH MG: 60 TABLET, FILM COATED ORAL at 17:05

## 2018-09-23 RX ADMIN — LEVOTHYROXINE SODIUM SCH MG: 75 TABLET ORAL at 06:54

## 2018-09-23 NOTE — PSYCHIATRIC EVALUATION
DATE OF SERVICE:  09/23/2018



AGE:  68.



SEX:  Male.



PHYSICIAN:  Dr. Coyle.



CHIEF COMPLAINT:  Severe agitation and aggressive behavior.



HISTORY OF PRESENT ILLNESS:  The patient is a 68-year-old male who is well known

to me for treatment in [facility].  The 

of a [facility] called me telling me that the patient has been out of control

and I evaluated the patient one day prior to his admission.  The patient was

hitting himself in the head and face and I adjusted the patient's medications,

but the patient continued to be aggressive and the next day, the patient was

transferred to the hospital.



The patient is still severely aggressive and agitated.  He has been having 3

days of continuous hitting himself on the top of his head as well as on his

face.  Also, has not been able to follow any directions and has been in angry

and in irritable mood.  Also, the patient has been rambling and has not been

able to fix first himself or to bring any coherent conversation.  He is

developmentally disabled and is living in a [facility] and care for developmentally

disabled.



PAST PSYCHIATRIC HISTORY:  The patient has history of psychosis and agitation.



PAST MEDICAL HISTORY:  The patient has history of hypertension as well as

hypothyroidism.



FAMILY HISTORY:  The patient has no psychiatric problems in the family but

family history of hypertension.



SOCIAL HISTORY:  The patient lives in [facility] and care in the [facility].  No known alcohol or drug use.  The patient belongs to a

[facility].  He does not smoke cigarettes.  Single, never  and no

children.



ALLERGIES:  No known allergies.



MENTAL STATUS EXAMINATION:  The patient appears older than his stated age. 

Confused.  Agitated.  Irritable mood.  Mumbles.  The patient is unable to carry

on any coherent conversation and his thought processes are word salad.  The

patient is actively hallucinating and actively responding to a stimuli, but did

not answer questions regarding auditory or visual hallucinations or delusions or

regarding suicide or homicide.  The patient is alert, but confused and

disoriented to time, place, person, and situation.  Unable to assess his memory

because the patient has been rambling and unable to follow directions.  Poor

insight and poor judgment.  He seems to be of moderate-to-severe mental

retardation based on his verbal ability.



ASSESSMENT:

PRIMARY DIAGNOSIS:  Unspecified psychosis.



SECONDARY DIAGNOSIS:  Mental retardation, moderate to severe.



MEDICAL DIAGNOSES:

1.  Hypothyroidism.

2.  Hypertension.



TREATMENT PLAN:  Continue to monitor his behavior and condition closely.  Also,

we will adjust the dose Seroquel and the dose of benztropine and we will stop

the ____.



ESTIMATED LENGTH OF STAY:  5-7 days.



THE PATIENT'S STRENGTHS AND WEAKNESSES:  The patient's strengths are that he has

support staff in [facility].  Weaknesses is his poor impulse control.



AFTER DISCHARGE PLAN:  The patient will return to [facility] with outpatient

treatment and followup.



CRITERIA FOR DISCHARGE:  The patient will not be agitated or psychotic and will

stabilize psychotropic medications and have better impulse control.





DD: 09/23/2018 14:45

DT: 09/23/2018 17:06

JOB# [id number]  [id number]

## 2018-09-23 NOTE — PROGRESS NOTES
DATE:  09/21/2018



Chart reviewed and the patient interviewed.  Also, discussed the patient's

condition with the staff and reviewed records and labs.  The patient is still

agitated and he is still trying to hit the top of his head with his hands.  He

also is still hitting his face.  The patient also is still irritable and is

still agitated.  He also still has difficulty following any of staff directions

and keeps mumbling and talking to self.  Otherwise, the patient is compliant

with taking medications with no side effects of medications.



ASSESSMENT:  The patient is still agitated and psychotic.



TREATMENT PLAN:  Continue to monitor his behavior and his condition closely. 

Also, we will decrease Seroquel to 12.5 mg in the morning and 25 mg at bedtime

and we will increase Klonopin to 2 mg twice a day and will continue to follow up

his condition and his behavior closely.





DD: 09/23/2018 15:14

DT: 09/23/2018 21:30

JOB# [id number]  [id number]

## 2018-09-23 NOTE — PROGRESS NOTES
DATE:  09/22/2018



SUBJECTIVE:  Chart reviewed and the patient interviewed.  Also discussed the

patient's condition with the staff and reviewed records and labs.  The patient

continued to be extremely agitated and rocking on the chair back and forth.  The

patient also still hitting the top of his head and hitting his face.  He is also

confused and restless.  Otherwise, the patient is slightly easier to redirect

him.  The patient denies any side effects of medications.



ASSESSMENT:  The patient is still agitated and psychotic.



TREATMENT PLAN:  Continue to monitor behavior and continue current psychotropic

medications and adjust psychotropic medications.  Also, continue to work on

behavioral modification.





DD: 09/23/2018 16:19

DT: 09/23/2018 21:58

JOB# [id number]  [id number]

## 2018-09-23 NOTE — PROGRESS NOTES
DATE:  



SUBJECTIVE:  Chart reviewed and the patient interviewed.  Also discussed the

patient's condition with the staff and reviewed records and labs.  The patient

is still easily agitated and hitting himself on top of his head and on his face.

 The patient also is restless and is still confused and is still in angry mood. 

The patient also is still rocking back and forth on his chair and hitting the

chair with his chest.  Otherwise, the patient is compliant with taking

medications with no side effects of medications.



ASSESSMENT:  The patient is still agitated and is still psychotic.



TREATMENT PLAN:  Continue adjusting psychotropic medications and working on

behavioral modification and continue to follow up.





DD: 09/23/2018 16:43

DT: 09/23/2018 22:01

JOB# [id number]  [id number]

## 2018-09-24 RX ADMIN — FEXOFENADINE HYDROCHLORIDE SCH MG: 60 TABLET, FILM COATED ORAL at 17:13

## 2018-09-24 RX ADMIN — FLUTICASONE PROPIONATE SCH SPR: 50 SPRAY, METERED NASAL at 10:17

## 2018-09-24 RX ADMIN — FEXOFENADINE HYDROCHLORIDE SCH MG: 60 TABLET, FILM COATED ORAL at 10:07

## 2018-09-24 NOTE — PROGRESS NOTES
DATE:  09/24/2018



SUBJECTIVE:  The patient seen and examined.  The patient is sitting in the

chair.  The patient cannot provide meaningful history.  Discussed with nursing

staff other concerns and treatment plan.



PHYSICAL EXAMINATION:

VITAL SIGNS:  Temperature 98.3, pulse 64, respiratory rate 18, blood pressure

113/65.

HEENT:  No facial asymmetry.

NECK:  Supple, no JVD.

HEART:  Regular.

CHEST:  Lung equal in expansion, no expiratory wheezing.

ABDOMEN:  Soft.

EXTREMITIES:  No edema.



CLINICAL IMPRESSION:

1.  Hypertension.

2.  Allergic rhinosinusitis.

3.  Hypothyroidism.

4.  Degenerative joint disease.

5.  Psychotic disorder.

6.  Developmental disability.

7.  Dementia.



PLAN:

1.  Psychotropic medications.

2.  Antihypertensive medicine.

3.  Synthroid.

4.  Fall precaution.

5.  Nutritional support.

6.  General nursing care.

7.  Medication _____ ordered.

8.  Care plan reviewed and discussed with staff.





DD: 09/24/2018 09:18

DT: 09/24/2018 09:47

JOB# [id number]  [id number]

## 2018-09-25 RX ADMIN — FLUTICASONE PROPIONATE SCH SPR: 50 SPRAY, METERED NASAL at 09:38

## 2018-09-25 RX ADMIN — LEVOTHYROXINE SODIUM SCH MG: 75 TABLET ORAL at 06:37

## 2018-09-25 RX ADMIN — LEVOTHYROXINE SODIUM SCH MG: 75 TABLET ORAL at 06:45

## 2018-09-25 RX ADMIN — FEXOFENADINE HYDROCHLORIDE SCH MG: 60 TABLET, FILM COATED ORAL at 16:42

## 2018-09-25 RX ADMIN — FEXOFENADINE HYDROCHLORIDE SCH MG: 60 TABLET, FILM COATED ORAL at 09:38

## 2018-09-25 NOTE — PROGRESS NOTES
DATE:  09/25/2018



SUBJECTIVE:  The patient coming in from [facility].  The patient

with out of control behaviors, hitting himself in the face, aggressive

behaviors, slapping his chest right now, yelling in a Kayy chair, not very

coherent, history of psychosis, agitation, and unable to really assess at this

time, nonsensical, just mumbling, moaning, yelling.



ASSESSMENT:  The patient is confused with ongoing symptoms indicative of anger

outbursts, behavioral outbursts, sometimes hitting himself, banging himself on

the chest, and slapping himself in the face, sometimes trying to get up from a

Kayy chair.



MEDICATIONS:  Reviewed.



PLAN:  We will continue to monitor, adjust and titrate medications.  I will

increase his Seroquel, adjust dosing to 37.5 mg.  Given his ongoing symptoms,

there are ongoing safety concerns.  He still attempting to hurt himself, hit

himself.





DD: 09/25/2018 13:34

DT: 09/25/2018 21:26

JOB# [id number]  [id number]

## 2018-09-25 NOTE — PROGRESS NOTES
DATE:  09/24/2018



A 68-year-old male well known to Dr. Coyle coming from [facility].  The patient with out of control behaviors, hitting himself in

the head, face.  On face-to-face, the patient is still hitting himself, hitting

his head on the mattress, swinging his arms, ongoing symptoms, not talking to

me.  He slept about 10 hours last night, not lashing out at others, but making

efforts to hurt himself, disoriented, confused, slapping his hand, slapping his

face, incoherent, anxious.  Behaviors remain highly unpredictable.



ASSESSMENT:  The patient remains symptomatic, restless, self-harming behaviors.



PLAN:  We will continue to monitor, continue to adjust dosages of medications,

titrate dosages of Seroquel over the next few days.





DD: 09/24/2018 16:32

DT: 09/25/2018 02:01

Psychiatric# [id number]  [id number]

## 2018-09-26 RX ADMIN — LEVOTHYROXINE SODIUM SCH MG: 75 TABLET ORAL at 07:37

## 2018-09-26 RX ADMIN — FEXOFENADINE HYDROCHLORIDE SCH MG: 60 TABLET, FILM COATED ORAL at 16:51

## 2018-09-26 RX ADMIN — FLUTICASONE PROPIONATE SCH: 50 SPRAY, METERED NASAL at 13:22

## 2018-09-26 RX ADMIN — FEXOFENADINE HYDROCHLORIDE SCH MG: 60 TABLET, FILM COATED ORAL at 09:20

## 2018-09-26 NOTE — PROGRESS NOTES
DATE:  09/26/2018



SUBJECTIVE:  Chart reviewed and the patient interviewed.  Also, discussed the

patient's condition with the staff and reviewed records and labs.  The patient

still has episodes of agitation and irritability, but seems to be slightly less.

 He is still trying to hit the top of his head with his fist and also hitting

his face, but also seems to be less.  He is still confused and unable to follow

any directions.  At the same time, he is compliant with taking medications with

no side effects of medications.



ASSESSMENT:  The patient is still confused and is still agitated, but slightly

less.



TREATMENT PLAN:  Continue monitoring behavior.  Also, increase Seroquel to 12.5

mg twice a day and 50 mg at bedtime and continue Klonopin 2 mg twice a day and

will continue to follow up.





DD: 09/26/2018 20:32

DT: 09/26/2018 22:12

JOB# [id number]  [id number]

## 2018-09-26 NOTE — PROGRESS NOTES
DATE:  



IDENTIFICATION:  A 68-year-old male.



SUBJECTIVE:  The patient seen and examined.  The patient is sitting in the

chair, much calmer than the last 2 days.  The patient remained afebrile.



PHYSICAL EXAMINATION:

GENERAL:  The patient is eating 100%.  Behavior is little better.

VITAL SIGNS:  Temperature 97.9, pulse is 53, respiratory rate 18, and blood

pressure 113/53.

HEENT:  No facial asymmetry.  Multiple absent teeth noted with poor oral

hygiene.

NECK:  Supple, no JVD.

HEART:  Regular.

CHEST AND LUNGS:  Equal in expansion, no expiratory wheezing.

ABDOMEN:  Soft.  No guarding or rigidity.  Bowel sounds present.  No palpable

mass.

EXTREMITIES:  No edema.

NEUROLOGIC:  Alert, awake, follows a little bit, but not following any commands.



AVAILABLE DIAGNOSTIC DATA:  None for my review.



CLINICAL IMPRESSION:

1.  Psychiatric disorder exacerbation.

2.  Hypertension.

3.  History of asthma.

4.  Allergic rhinosinusitis.

5.  Developmental disability.

6.  Dementia.

7.  Degenerative joint disease.

8.  High risk for fall.



PLAN:

1.  Continue antihypertensive medicine.

2.  Low sodium diet.

3.  Synthroid.

4.  Fall precautions.

5.  General nursing care.

6.  Nutritional support.

7.  Follow, psychotic evaluation and management deferred to psychiatrist.

8.  Care plan reviewed and discussed with staff.





DD: 09/26/2018 10:21

DT: 09/26/2018 18:24

JOB# [id number]  [id number]

## 2018-09-27 RX ADMIN — LEVOTHYROXINE SODIUM SCH MG: 75 TABLET ORAL at 06:43

## 2018-09-27 RX ADMIN — FLUTICASONE PROPIONATE SCH SPR: 50 SPRAY, METERED NASAL at 09:51

## 2018-09-27 RX ADMIN — FEXOFENADINE HYDROCHLORIDE SCH MG: 60 TABLET, FILM COATED ORAL at 09:33

## 2018-09-27 RX ADMIN — FEXOFENADINE HYDROCHLORIDE SCH MG: 60 TABLET, FILM COATED ORAL at 17:43

## 2018-09-27 RX ADMIN — FEXOFENADINE HYDROCHLORIDE SCH MG: 60 TABLET, FILM COATED ORAL at 13:03

## 2018-09-27 NOTE — PROGRESS NOTES
DATE:  09/27/2018



SUBJECTIVE:  The patient seen and examined.  The patient is still agitated,

confused, but less aggressive.  The patient has been followed by psychiatrist

and his psychotropic medications have been adjusted.  Discussed with nursing

staff.  No new event has been noted.



PHYSICAL EXAMINATION:

VITAL SIGNS:  Temperature 97.7, pulse was reported 44, blood pressure 113/49,

and respiratory rate is 18.

HEENT:  No facial asymmetry.  Multiple absent teeth with poor dentition noted.

NECK:  Supple, no JVD.

HEART:  Regular.

CHEST AND LUNGS:  Equal in expansion, no expiratory wheezing.

ABDOMEN:  Soft.  No guarding, no rigidity.  Bowel sounds are present.  No

palpable mass.

EXTREMITIES:  No edema.



CLINICAL IMPRESSION:

1.  Bradycardia.

2.  Hypertension.

3.  Allergic rhinosinusitis.

4.  Hypothyroidism.

5.  Degenerative joint disease.

6.  Psychotic disorder.

7.  Developmental disability.

8.  Dementia.



PLAN:

1.  Get a baseline EKG.

2.  Antihypertensive medicine.

3.  Fall precautions.

4.  Nutritional support.

5.  General nursing care.

6.  Continue current medication as previously prescribed.

7.  Psychiatric evaluation and management deferred to psychiatrist.

8.  Care plan reviewed and discussed with staff.





DD: 09/27/2018 09:28

DT: 09/27/2018 16:05

JOB# [id number]  [id number]

## 2018-09-27 NOTE — PROGRESS NOTES
DATE:  



SUBJECTIVE:  Chart reviewed and the patient interviewed.  Also discussed the

patient's condition with the staff and reviewed records and labs.  The patient

still needs redirections and is still slightly confused.  The patient also still

has difficulty following directions, but less than before.  The patient also is

still crying at times to hit top of his head, but also that seems to be less. 

Otherwise, the patient is cooperative and compliant with taking his medications

with no side effects of medications.



PLAN:  Continue current medications and monitor behavior closely and we will

continue to follow up.





DD: 09/27/2018 07:26

DT: 09/27/2018 10:08

JOB# [id number]  [id number]

## 2018-09-28 RX ADMIN — FLUTICASONE PROPIONATE SCH SPR: 50 SPRAY, METERED NASAL at 09:22

## 2018-09-28 RX ADMIN — FEXOFENADINE HYDROCHLORIDE SCH MG: 60 TABLET, FILM COATED ORAL at 09:19

## 2018-09-28 RX ADMIN — FEXOFENADINE HYDROCHLORIDE SCH MG: 60 TABLET, FILM COATED ORAL at 17:09

## 2018-09-28 RX ADMIN — LEVOTHYROXINE SODIUM SCH MG: 75 TABLET ORAL at 06:36

## 2018-09-28 NOTE — PROGRESS NOTES
DATE:  



SUBJECTIVE:  The patient seen and examined.  The patient is lying in the bed. 

No new event.



PHYSICAL EXAMINATION:

VITAL SIGNS:  Temperature 98, pulse is 68, respiratory rate 18, and blood

pressure 104/67.

HEENT:  No facial asymmetry.  Poor dentition noted.

NECK:  Supple, no JVD.

HEART:  Regular.

CHEST:  Lung equal in expansion, no expiratory wheezing.

ABDOMEN:  Soft.

EXTREMITIES:  No edema.

NEUROLOGICAL:  Alert but not following any commands.



CLINICAL IMPRESSION:

1.  Intellectual disability.

2.  Hypertension.

3.  Asthma.

4.  Allergic rhinosinusitis.

5.  Degenerative joint disease.

6.  Hypothyroidism.

7.  Psychotic disorder.



PLAN:

1.  Psychotic evaluation and management deferred to psychiatrist.

2.  Antihypertensive medicine.

3.  Synthroid.

4.  General nursing care.

5.  Allegra.

6.  P.r.n. inhalation therapy.

7.  Fall precaution.

8.  Nutritional support.

9.  Symptoms management.

10.  Medication management.

11.  Care plan reviewed and discussed with staff.





DD: 09/28/2018 20:18

DT: 09/28/2018 21:19

JOB# [id number]  [id number]

## 2018-09-28 NOTE — PROGRESS NOTES
DATE:  09/28/2018



PSYCHIATRIC PROGRESS NOTE



SUBJECTIVE:  Chart reviewed and the patient interviewed.  Also discussed the

patient's condition with the staff and reviewed records and labs.  The patient

seems to be less confused and less anxious.  The patient also is interacting

more.  Also, still had episodes of hitting himself, but seems to be also less

than before.  Also slightly easier to redirect him.  Otherwise, the patient is

compliant with taking medications with no side effects.



ASSESSMENT:  The patient showed slight improvement, especially with

self-destructive behaviors.



TREATMENT PLAN:  Continue monitoring his behavior and adjusting psychotropic

medications and also continue to work on his irritability and discharge plans.





DD: 09/28/2018 18:47

DT: 09/28/2018 22:30

JOB# [id number]  [id number]

## 2018-09-29 RX ADMIN — LEVOTHYROXINE SODIUM SCH MG: 75 TABLET ORAL at 06:42

## 2018-09-29 RX ADMIN — FEXOFENADINE HYDROCHLORIDE SCH MG: 60 TABLET, FILM COATED ORAL at 16:44

## 2018-09-29 RX ADMIN — FEXOFENADINE HYDROCHLORIDE SCH MG: 60 TABLET, FILM COATED ORAL at 09:10

## 2018-09-29 RX ADMIN — FLUTICASONE PROPIONATE SCH SPR: 50 SPRAY, METERED NASAL at 09:13

## 2018-09-29 RX ADMIN — LEVOTHYROXINE SODIUM SCH MG: 75 TABLET ORAL at 06:53

## 2018-09-29 NOTE — PROGRESS NOTES
DATE:  09/29/2018



PSYCHIATRIC PROGRESS NOTE



Chart reviewed and the patient interviewed.  Also discussed the patient's

condition with the staff and reviewed records and labs.  The patient seems to be

calmer and less agitated and less irritable.  The patient also is easier to

redirect him.  Also have fair appetite and fair hygiene.  The patient also is

able to follow direction, but easier, although he is still slightly confused. 

Otherwise, the patient shows no side effects of medications.



ASSESSMENT:  The patient is less agitated and less irritable.



TREATMENT PLAN:  Continue monitoring his behavior and continue working on

behavioral modification.  Also, we will continue adjusting psychotropic

medications and working on discharge plans.





DD: 09/29/2018 08:06

DT: 09/29/2018 08:38

JOB# [id number]  [id number]

## 2018-09-30 RX ADMIN — FLUTICASONE PROPIONATE SCH SPR: 50 SPRAY, METERED NASAL at 08:40

## 2018-09-30 RX ADMIN — FEXOFENADINE HYDROCHLORIDE SCH MG: 60 TABLET, FILM COATED ORAL at 09:16

## 2018-09-30 RX ADMIN — FEXOFENADINE HYDROCHLORIDE SCH MG: 60 TABLET, FILM COATED ORAL at 16:35

## 2018-09-30 RX ADMIN — LEVOTHYROXINE SODIUM SCH MG: 75 TABLET ORAL at 06:46

## 2018-10-01 RX ADMIN — FEXOFENADINE HYDROCHLORIDE SCH MG: 60 TABLET, FILM COATED ORAL at 16:47

## 2018-10-01 RX ADMIN — FLUTICASONE PROPIONATE SCH SPR: 50 SPRAY, METERED NASAL at 09:27

## 2018-10-01 RX ADMIN — FEXOFENADINE HYDROCHLORIDE SCH MG: 60 TABLET, FILM COATED ORAL at 09:24

## 2018-10-01 RX ADMIN — LEVOTHYROXINE SODIUM SCH MG: 75 TABLET ORAL at 06:50

## 2018-10-01 NOTE — PROGRESS NOTES
DATE:  09/30/2018



SUBJECTIVE:  Chart reviewed and the patient interviewed.  Also, discussed the

patient's condition with the staff and reviewed records and labs.  The patient

seems to be calmer.  The patient is less irritable and less agitated.  Easier to

redirect him.  The patient also is compliant with taking his medications with no

side effects of medications.



ASSESSMENT:  The patient seems to be calmer.



TREATMENT PLAN:  We will continue to monitor his behavior and continue current

medications.  Also, discharge plans and trying to discharge the patient tomorrow

if he continues to improve.





DD: 09/30/2018 10:37

DT: 10/01/2018 01:03

JOB# [id number]  [id number]

## 2018-10-02 RX ADMIN — FLUTICASONE PROPIONATE SCH SPR: 50 SPRAY, METERED NASAL at 09:38

## 2018-10-02 RX ADMIN — FEXOFENADINE HYDROCHLORIDE SCH MG: 60 TABLET, FILM COATED ORAL at 09:38

## 2018-10-02 RX ADMIN — FEXOFENADINE HYDROCHLORIDE SCH MG: 60 TABLET, FILM COATED ORAL at 16:08

## 2018-10-02 RX ADMIN — LEVOTHYROXINE SODIUM SCH MG: 75 TABLET ORAL at 06:32

## 2018-10-02 NOTE — PROGRESS NOTES
DATE:  



SUBJECTIVE:  Chart reviewed and the patient interviewed.  I also discussed the

patient's condition with the staff and reviewed records and labs.  The patient

is still agitated and restless and is still having episodes of hitting himself. 

The patient also is suspicious and he is resisting care.  The patient is also

compliant with taking his medications, yet he is still agitated and irritable.



Otherwise, ____ his medications, but it seems that the Seroquel has not been

helping him as much.



ASSESSMENT:  The patient is still psychotic and can be dangerous to self.



TREATMENT PLAN:  Continue to monitor his behavior and his condition closely and

continue adjusting psychotropic medication and followup.





DD: 10/01/2018 18:56

DT: 10/02/2018 13:27

JOB# [id number]  [id number]

## 2018-10-02 NOTE — PROGRESS NOTES
DATE:  10/01/2018



THE PATIENT'S ID:  A 68-year-old male.



SUBJECTIVE:  The patient is seen and examined.  The patient is lying in the bed.

 The patient is much calmer than before, taking his medications.  The patient

remained hemodynamically stable.  Unable to get meaningful history.



PHYSICAL EXAMINATION:

VITAL SIGNS:  Temperature 98.4, pulse is 61, respiratory rate is 18, blood

pressure 122/68.

HEENT:  No facial asymmetry.  Poor dentition noted.

NECK:  Supple, no JVD.

HEART:  Regular.

CHEST AND LUNGS:  Equal in expansion, no expiratory wheezing.

ABDOMEN:  Soft.  No guarding or rigidity.  Bowel sounds are present.  No

palpable masses.

EXTREMITIES:  No edema, no cyanosis.  Peripheral +1.  No calf tenderness.



MEDICATIONS:  Medication administration record was reviewed.



CLINICAL IMPRESSION:

1.  Hypertension.

2.  Asthma.

3.  Degenerative joint disease.

4.  Hypothyroidism.

5.  Psychotic disorder.

6.  High risk for fall.



PLAN:

1.  Antihypertensive medicine.

2.  Monitor blood pressure.

3.  Synthroid.

4.  Fall precautions.

5.  Nutritional support.

6.  General nursing care.

7.  Psych evaluation and management deferred to psychiatrist.

8.  Care plan has been reviewed and discussed with staff.





DD: 10/01/2018 18:09

DT: 10/02/2018 08:39

JOB# [id number]  [id number]

## 2018-10-02 NOTE — DISCHARGE SUMMARY
DATE OF DISCHARGE:  10/02/2018



DATE OF DISCHARGE:  10/02/2018.



AGE:  68.



SEX:  Male.



PHYSICIAN:  Dr. Coyle.



FINAL DIAGNOSIS:

PRIMARY DIAGNOSIS:  Unspecified psychosis.



SECONDARY DIAGNOSIS:  Mental retardation, moderate to severe, with psychotic

features and behavioral disturbances.



REASON FOR HOSPITALIZATION:  The patient was admitted to the hospital because of

increased agitation and irritability and the patient also was hitting himself in

his face and on the top of his head and the staff in [place] was not able

to handle his irritability and the patient was transferred to the hospital.



HOSPITAL COURSE:  The patient continued to be agitated and in irritable mood. 

The patient was given Seroquel, but did not help with the patient and it was

changed to Zyprexa.  The patient's affect was brighter and the patient was

getting slowly better.  The patient was not suicidal or homicidal, and the

patient was discharged from the hospital.



Physical exam of the patient showed no major medical problems and the patient

had no major medical issues while in the hospital.



AFTER DISCHARGE PLANS:  The patient discharged from the hospital and went back

to [place] with plan to follow him up there.



EXPECTED OUTCOME AFTER DISCHARGE:  Fair if the patient continued to take

psychotropic medications and follow up with discharge plans.





DD: 10/02/2018 07:07

DT: 10/02/2018 16:45

JOB# [id number]  [id number]

## 2018-10-03 RX ADMIN — FLUTICASONE PROPIONATE SCH SPR: 50 SPRAY, METERED NASAL at 09:37

## 2018-10-03 RX ADMIN — FEXOFENADINE HYDROCHLORIDE SCH MG: 60 TABLET, FILM COATED ORAL at 16:43

## 2018-10-03 RX ADMIN — FEXOFENADINE HYDROCHLORIDE SCH MG: 60 TABLET, FILM COATED ORAL at 09:36

## 2018-10-03 RX ADMIN — LEVOTHYROXINE SODIUM SCH MG: 75 TABLET ORAL at 06:41

## 2018-10-03 NOTE — PROGRESS NOTES
DATE:  10/02/2018



PSYCHIATRIC PROGRESS NOTE



Chart reviewed and the patient interviewed.  Also discussed the patient's

condition with the staff and reviewed records and labs.  The patient was

supposed to be discharged yesterday and I dictated discharge summary.  The

patient's caregiver refused to take the patient because when they came to pick

him up, the patient was severely agitated and restless and he was in irritable

mood and he was hitting himself on his head and discharge was rescinded until he

is calmer and the patient was not discharged.





DD: 10/03/2018 06:17

DT: 10/03/2018 06:33

JOB# [id number]  [id number]

## 2018-10-03 NOTE — PROGRESS NOTES
DATE:  10/03/2018



SUBJECTIVE:  Chart reviewed and the patient interviewed.  Also discussed the

patient's condition with the staff and reviewed records and labs.  The patient

still has episodes of agitation and irritability that postponed his discharge

yesterday.  The patient still needs lots of redirections and he still has

episodes of hitting and uncooperative with treatment because of both his mental

inabilities and his episodes of agitation.  The patient also is still confused. 

Otherwise, the patient is compliant with taking his medications with no side

effects of medications.



ASSESSMENT:  The patient is still agitated and is still in irritable mood and

psychotic.



TREATMENT PLAN:  We will continue to monitor his behavior and his condition. 

Also, we will increase Zyprexa to 5 mg 3 times a day and 2.5 mg every 6 hours on

a p.r.n. for agitation and we will continue to follow up his behavior closely. 

Also, we will work with  as well as the group home in regard to

discharge plans.





DD: 10/03/2018 06:21

DT: 10/03/2018 06:38

JOB# [id number]  [id number]

## 2018-10-04 RX ADMIN — FLUTICASONE PROPIONATE SCH SPR: 50 SPRAY, METERED NASAL at 09:05

## 2018-10-04 RX ADMIN — FEXOFENADINE HYDROCHLORIDE SCH MG: 60 TABLET, FILM COATED ORAL at 17:41

## 2018-10-04 RX ADMIN — LEVOTHYROXINE SODIUM SCH: 75 TABLET ORAL at 06:30

## 2018-10-04 RX ADMIN — FEXOFENADINE HYDROCHLORIDE SCH MG: 60 TABLET, FILM COATED ORAL at 09:04

## 2018-10-04 NOTE — PROGRESS NOTES
DATE:  



SUBJECTIVE:  Chart reviewed and the patient interviewed.  Also discussed the

patient's condition with the staff and reviewed records and labs.  The patient

still has episodes of anxious and irritability and cutting himself, but seems to

be less than before.  He still needs lots of redirections.  The patient also is

denying any side effects of medications.  He still has not been able to follow

instructions and because of his aggression group home where he lives currently

refusing to take him according to staff.



ASSESSMENT:  The patient is still agitated and psychotic.



TREATMENT PLAN:  Continue to monitor his behavior and his condition closely and

continue adjusting psychotropic medications and continue to follow up.





DD: 10/04/2018 22:30

DT: 10/04/2018 23:01

JOB# [id number]  [id number]

## 2018-10-05 RX ADMIN — FEXOFENADINE HYDROCHLORIDE SCH MG: 60 TABLET, FILM COATED ORAL at 18:42

## 2018-10-05 RX ADMIN — FEXOFENADINE HYDROCHLORIDE SCH MG: 60 TABLET, FILM COATED ORAL at 09:09

## 2018-10-05 RX ADMIN — FLUTICASONE PROPIONATE SCH SPR: 50 SPRAY, METERED NASAL at 09:08

## 2018-10-05 RX ADMIN — LEVOTHYROXINE SODIUM SCH MG: 75 TABLET ORAL at 06:31

## 2018-10-05 NOTE — PROGRESS NOTES
DATE:  10/05/2018



SUBJECTIVE:  Chart reviewed and the patient interviewed.  Also discussed the

patient's condition with the staff and reviewed records and labs.  The patient

is still extremely agitated and irritable, but seems to be calmer than before. 

The patient also is slightly easier to redirect him.  The patient also is

interacting slightly more.  He denies any intention to harm self or others.  The

patient also denies any side effects of medications.  He is still confused and

forgetful because of his mental disabilities.



ASSESSMENT:  The patient seems to be slightly calmer, but still agitated and

irritable.



TREATMENT PLAN:  Continue monitoring his behavior and his condition closely. 

Also, we will increase Zyprexa to 5 mg twice a day and 10 mg at bedtime and

continue to follow up closely.





DD: 10/05/2018 07:01

DT: 10/05/2018 07:28

JOB# [id number]  [id number]

## 2018-10-06 RX ADMIN — FEXOFENADINE HYDROCHLORIDE SCH MG: 60 TABLET, FILM COATED ORAL at 09:13

## 2018-10-06 RX ADMIN — FLUTICASONE PROPIONATE SCH SPR: 50 SPRAY, METERED NASAL at 09:14

## 2018-10-06 RX ADMIN — LEVOTHYROXINE SODIUM SCH MG: 75 TABLET ORAL at 06:48

## 2018-10-06 RX ADMIN — FEXOFENADINE HYDROCHLORIDE SCH MG: 60 TABLET, FILM COATED ORAL at 17:23

## 2018-10-06 NOTE — PROGRESS NOTES
DATE:  10/06/2018



SUBJECTIVE:  Chart reviewed and the patient interviewed.  Also, discussed the

patient's condition with the staff and reviewed records and labs.  The patient

is calmer and less irritable and less agitated.  The patient also is easier to

redirect.  The patient also slept slightly better last night.  Also, the patient

denies any side effects of medications.



ASSESSMENT:  The patient is less agitated, but still needs redirection.



TREATMENT PLAN:  We will continue monitoring his behavior and his condition

closely.  Also, continue adjusting psychotropic medications and monitor his

behavior.





DD: 10/06/2018 15:44

DT: 10/06/2018 19:29

JOB# [id number]  [id number]

## 2018-10-07 RX ADMIN — FEXOFENADINE HYDROCHLORIDE SCH MG: 60 TABLET, FILM COATED ORAL at 17:22

## 2018-10-07 RX ADMIN — FEXOFENADINE HYDROCHLORIDE SCH MG: 60 TABLET, FILM COATED ORAL at 09:20

## 2018-10-07 RX ADMIN — FLUTICASONE PROPIONATE SCH SPR: 50 SPRAY, METERED NASAL at 09:24

## 2018-10-07 RX ADMIN — LEVOTHYROXINE SODIUM SCH MG: 75 TABLET ORAL at 06:44

## 2018-10-07 NOTE — PROGRESS NOTES
DATE:  



SUBJECTIVE:  Chart reviewed and the patient interviewed.  Also discussed the

patient's condition with the staff and reviewed records and labs.  The patient

is still guarded.  The patient also is still combative at times and tries to hit

himself, but seems to be less than before.  The patient also is still banging

his head at the times and slapping his face.  On the other hand, the patient

continued to comply with taking his medications with no side effects of

medications.



ASSESSMENT:  The patient is still agitated and in irritable mood.



TREATMENT PLAN:  Continue to monitor behavior and condition closely and continue

adjusting psychotropic medications and working on discharge plans and placement

issue.





DD: 10/07/2018 12:27

DT: 10/07/2018 20:23

JOB# [id number]  [id number]

## 2018-10-07 NOTE — PROGRESS NOTES
DATE:  10/07/2018



IDENTIFICATION:  This is a 68-year-old male.



SUBJECTIVE:  The patient is seen and examined, unable to get meaningful history

from the patient.  Discussed with nursing staff about their concerns.



PHYSICAL EXAMINATION:

VITAL SIGNS:  On today's exam, temperature 99, pulse is 90, respiratory rate 18,

blood pressure 118/80.

HEENT:  No facial asymmetry.  Poor dentition noted.

NECK:  Supple, no JVD.

HEART:  Both heart sounds are regular.

CHEST AND LUNGS:  Equal in expansion, no expiratory wheezing.

ABDOMEN:  Soft.  No guarding or rigidity.  Bowel sounds are present.  No

palpable mass.

EXTREMITIES:  No edema.

NEUROLOGIC:  Not following any commands.



CLINICAL IMPRESSION:

1.  Hypertension.

2.  Hypothyroidism.

3.  Psychotic disorder.

4.  Degenerative joint disease.

5.  Dementia.

6.  Allergic rhinosinusitis.

7.  Developmental disability.

8.  Fall risk.



PLAN:

1.  Fall precautions.

2.  Synthroid.

3.  Antihypertensive medicine.

4.  Psych medication.

5.  Psych followup.

6.  General nursing care.

7.  Nutritional support.

8.  Followup lab. 

9.  We will continue to follow this patient during the stay in the hospital.





DD: 10/07/2018 21:50

DT: 10/07/2018 23:55

JOB# [id number]  [id number]

## 2018-10-08 LAB
ALBUMIN SERPL-MCNC: 3.7 GM/DL (ref 4.2–5.5)
ALBUMIN/GLOB SERPL: 1.4 {RATIO} (ref 1–1.8)
ALP SERPL-CCNC: 92 U/L (ref 34–104)
ALT SERPL-CCNC: 15 U/L (ref 7–52)
ANION GAP SERPL CALC-SCNC: 10 MMOL/L (ref 7–16)
AST SERPL-CCNC: 15 U/L (ref 13–39)
BASOPHILS # BLD AUTO: 0 TH/CUMM (ref 0–0.2)
BASOPHILS NFR BLD AUTO: 0.4 % (ref 0–2)
BILIRUB SERPL-MCNC: 0.4 MG/DL (ref 0.3–1)
BUN SERPL-MCNC: 16 MG/DL (ref 7–25)
CALCIUM SERPL-MCNC: 9.1 MG/DL (ref 8.6–10.3)
CHLORIDE SERPL-SCNC: 108 MEQ/L (ref 98–107)
CO2 SERPL-SCNC: 25.6 MEQ/L (ref 21–31)
CREAT SERPL-MCNC: 0.8 MG/DL (ref 0.7–1.3)
EOSINOPHIL # BLD AUTO: 0.4 TH/CMM (ref 0.1–0.4)
EOSINOPHIL NFR BLD AUTO: 4.5 % (ref 0–5)
ERYTHROCYTE [DISTWIDTH] IN BLOOD BY AUTOMATED COUNT: 13 % (ref 11.5–20)
GLOBULIN SER-MCNC: 2.7 GM/DL
GLUCOSE SERPL-MCNC: 101 MG/DL (ref 70–105)
HCT VFR BLD CALC: 41.4 % (ref 41–60)
HGB BLD-MCNC: 13.9 GM/DL (ref 12–16)
LYMPHOCYTE AB SER FC-ACNC: 3 TH/CMM (ref 1.5–3)
LYMPHOCYTES NFR BLD AUTO: 30.7 % (ref 20–50)
MCH RBC QN AUTO: 28.8 PG (ref 27–31)
MCHC RBC AUTO-ENTMCNC: 33.7 PG (ref 28–36)
MCV RBC AUTO: 85.5 FL (ref 80–99)
MONOCYTES # BLD AUTO: 0.9 TH/CMM (ref 0.3–1)
MONOCYTES NFR BLD AUTO: 8.6 % (ref 2–10)
NEUTROPHILS # BLD: 5.6 TH/CMM (ref 1.8–8)
NEUTROPHILS NFR BLD AUTO: 55.8 % (ref 40–80)
PLATELET # BLD: 270 TH/CMM (ref 150–400)
PMV BLD AUTO: 8.8 FL
POTASSIUM SERPL-SCNC: 3.6 MEQ/L (ref 3.5–5.1)
RBC # BLD AUTO: 4.84 MIL/CMM (ref 3.8–5.8)
SODIUM SERPL-SCNC: 140 MEQ/L (ref 136–145)
WBC # BLD AUTO: 9.9 TH/CMM (ref 4.8–10.8)

## 2018-10-08 RX ADMIN — FLUTICASONE PROPIONATE SCH SPR: 50 SPRAY, METERED NASAL at 09:11

## 2018-10-08 RX ADMIN — FEXOFENADINE HYDROCHLORIDE SCH MG: 60 TABLET, FILM COATED ORAL at 16:30

## 2018-10-08 RX ADMIN — LEVOTHYROXINE SODIUM SCH MG: 75 TABLET ORAL at 06:44

## 2018-10-08 RX ADMIN — FEXOFENADINE HYDROCHLORIDE SCH MG: 60 TABLET, FILM COATED ORAL at 09:11

## 2018-10-09 RX ADMIN — FLUTICASONE PROPIONATE SCH SPR: 50 SPRAY, METERED NASAL at 09:52

## 2018-10-09 RX ADMIN — FEXOFENADINE HYDROCHLORIDE SCH MG: 60 TABLET, FILM COATED ORAL at 09:52

## 2018-10-09 RX ADMIN — LEVOTHYROXINE SODIUM SCH MG: 75 TABLET ORAL at 06:44

## 2018-10-09 NOTE — PROGRESS NOTES
DATE:  



SUBJECTIVE:  Chart reviewed and the patient interviewed.  Also discussed the

patient's condition with the staff and reviewed records and labs.  The patient

still has episodes of agitation and irritability, but seems to be less than

before.  The patient also still needs redirections.  Otherwise, the patient is

compliant with taking his medications.  The patient also is still compliant with

taking medications with no side effects of medications.



ASSESSMENT:  The patient is still agitated, but slightly getting better.



TREATMENT PLAN:  Continue to monitor behavior and also discussed with case

manager discharge plans and placement issue.





DD: 10/08/2018 18:03

DT: 10/09/2018 06:28

JOB# [id number]  [id number]

## 2018-10-09 NOTE — PROGRESS NOTES
DATE:  



IDENTIFICATION:  A 68-year-old male.



SUBJECTIVE:  The patient was seen and examined by me on 10/09/2018 at 5:45 p.m.

before the patient was going to be discharged.  The patient was seen with a

caretaker who will be taking care of this patient to assist his board and care

facility.  The patient is much calmer than before.  Caretaker is asking me to

refill his medication as well.  The patient currently does not provide a

meaningful history.



PHYSICAL EXAMINATION:

VITAL SIGNS:  Temperature 98.5, pulse 70, respiratory rate is 18, blood pressure

130/60.

HEENT:  Poor dentition.

NECK:  Supple, no JVD.

HEART:  Regular.

CHEST AND LUNGS:  Equal in expansion, no expiratory wheezing.

ABDOMEN:  Soft.  No guarding, no rigidity.  Bowel sounds present.

EXTREMITIES:  No edema.

NEUROLOGIC:  Not following any commands.



CLINICAL IMPRESSION:

1.  Hypertension.

2.  Hypothyroidism.

3.  Allergic rhinosinusitis.

4.  History of asthma.

5.  Degenerative joint disease.

6.  Gastroesophageal reflux disease.

7.  Dysphagia.



PLAN:  The patient is stable for discharge to home with antihypertensive

medication with Synthroid, change Allegra to Claritin since it is expensive and

continue with the Flonase nasal spray as prescribed and use albuterol inhaler

other than inhalation therapy, discontinue his Mylanta and use Prilosec 20 mg

daily as well, which prescription has been given as well.  The patient will be

followed by primary care MD in nursing at assisted living facility.





DD: 10/09/2018 18:14

DT: 10/09/2018 20:37

JOB# [id number]  [id number]

## 2018-10-09 NOTE — PROGRESS NOTES
DATE:  10/08/2018



SUBJECTIVE:  The patient seen and examined.  No new event.



OBJECTIVE:

VITAL SIGNS:  Temperature 98, pulse is 91, respiratory rate 18, blood pressure

115/78.

HEENT:  No facial asymmetry.

NECK:  Supple, no JVD.

HEART:  Regular.

LUNGS:  Clear.

ABDOMEN:  Soft.

EXTREMITIES:  No edema.



AVAILABLE DIAGNOSTIC DATA:  White count of 9.9, hemoglobin 13.9, platelet 270. 

BUN and creatinine are 16 and 0.8.  Liver functions are normal.  Albumin of 3.7.



CLINICAL IMPRESSION:

1.  Developmental delay.

2.  Psychotic disorder.

3.  Degenerative joint disease.

4.  Hypertension.

5.  Hypothyroidism.

6.  Allergic rhinosinusitis.

7.  History of asthma.

8.  Fall risk.

9.  Dysphagia.



PLAN:

1.  Antihypertensive medicine.

2.  Low sodium diet.

3.  Synthroid.

4.  Psych medication.

5.  General nursing care.

6.  Nutritional support.

7.  I will continue to follow this patient during the stay in the hospital.

8.  Care plan has been reviewed and discussed with staff.





DD: 10/08/2018 09:55

DT: 10/09/2018 01:51

JOB# [id number]  [id number]

## 2018-10-09 NOTE — DISCHARGE SUMMARY
DATE OF DISCHARGE:  10/09/2018



AGE:  68.



SEX:  Male.



PHYSICIAN:  Dr. Coyle.



FINAL DIAGNOSIS:

PRIMARY DIAGNOSIS:  Unspecified psychosis.



SECONDARY DIAGNOSIS:  Mental retardation, moderate to severe.



REASON FOR HOSPITALIZATION:  The patient was admitted to the hospital because of

increased agitation and self-destructive behavior including hitting himself on

the face and on top of his head and unable to follow any directions.



HOSPITAL COURSE:  The patient continued to be in irritable and angry mood.  The

patient was given Seroquel, but it was not effective and did not help the

patient much and Zyprexa Zydis was added.  That helped the patient to be calmer

and less agitated and less irritable.  Also, is able to follow directions

easily.  [facility] agreed to take the patient back and

the patient was discharged there.



PHYSICAL EXAM:  Physical exam of the patient showed no major medical problems. 

Blood workup was also basically within normal.



AFTER-DISCHARGE PLANS:  The patient discharged from the hospital with plans for

outpatient treatment and followup.



EXPECTED OUTCOME AFTER DISCHARGE:  Fair if the patient continues to take

psychotropic medications and follow up with discharge plans.





DD: 10/09/2018 19:17

DT: 10/09/2018 19:49

JOB# [id number]  [id number]

## 2018-10-18 ENCOUNTER — HOSPITAL ENCOUNTER (INPATIENT)
Dept: HOSPITAL 4 - SED | Age: 68
LOS: 5 days | Discharge: HOME HEALTH | DRG: 871 | End: 2018-10-23
Attending: FAMILY MEDICINE | Admitting: FAMILY MEDICINE
Payer: COMMERCIAL

## 2018-10-18 VITALS — HEIGHT: 71 IN | WEIGHT: 155 LBS | BODY MASS INDEX: 21.7 KG/M2

## 2018-10-18 VITALS — SYSTOLIC BLOOD PRESSURE: 108 MMHG

## 2018-10-18 DIAGNOSIS — E03.9: ICD-10-CM

## 2018-10-18 DIAGNOSIS — G93.41: ICD-10-CM

## 2018-10-18 DIAGNOSIS — J45.909: ICD-10-CM

## 2018-10-18 DIAGNOSIS — I10: ICD-10-CM

## 2018-10-18 DIAGNOSIS — Z79.899: ICD-10-CM

## 2018-10-18 DIAGNOSIS — Z79.1: ICD-10-CM

## 2018-10-18 DIAGNOSIS — F03.90: ICD-10-CM

## 2018-10-18 DIAGNOSIS — N39.0: ICD-10-CM

## 2018-10-18 DIAGNOSIS — A41.9: Primary | ICD-10-CM

## 2018-10-18 DIAGNOSIS — E43: ICD-10-CM

## 2018-10-18 LAB
ALBUMIN SERPL BCP-MCNC: 3.3 G/DL (ref 3.4–4.8)
ALT SERPL W P-5'-P-CCNC: 19 U/L (ref 12–78)
ANION GAP SERPL CALCULATED.3IONS-SCNC: 4 MMOL/L (ref 5–15)
APPEARANCE UR: (no result)
AST SERPL W P-5'-P-CCNC: 10 U/L (ref 10–37)
BACTERIA URNS QL MICRO: (no result) /HPF
BASOPHILS NFR BLD MANUAL: 0 % (ref 0–2)
BILIRUB SERPL-MCNC: 0.5 MG/DL (ref 0–1)
BILIRUB UR QL STRIP: NEGATIVE
BUN SERPL-MCNC: 17 MG/DL (ref 8–21)
CALCIUM SERPL-MCNC: 9 MG/DL (ref 8.4–11)
CHLORIDE SERPL-SCNC: 103 MMOL/L (ref 98–107)
COLOR UR: YELLOW
CREAT SERPL-MCNC: 1.22 MG/DL (ref 0.55–1.3)
EOSINOPHIL NFR BLD MANUAL: 0 % (ref 0–7)
ERYTHROCYTE [DISTWIDTH] IN BLOOD BY AUTOMATED COUNT: 13.4 % (ref 9–15)
GFR SERPL CREATININE-BSD FRML MDRD: 76 ML/MIN (ref 90–?)
GLUCOSE SERPL-MCNC: 134 MG/DL (ref 70–99)
GLUCOSE UR STRIP-MCNC: NEGATIVE MG/DL
HCT VFR BLD AUTO: 44 % (ref 36–54)
HGB BLD-MCNC: 14.5 G/DL (ref 14–18)
HGB UR QL STRIP: (no result)
INR PPP: 1.1 (ref 0.8–1.2)
KETONES UR STRIP-MCNC: NEGATIVE MG/DL
LEUKOCYTE ESTERASE UR QL STRIP: (no result)
LYMPHOCYTES NFR BLD MANUAL: 12 % (ref 20–46)
MCH RBC QN AUTO: 28 PG (ref 27–31)
MCHC RBC AUTO-ENTMCNC: 33 % (ref 32–36)
MCV RBC AUTO: 85 FL (ref 79–98)
MONOCYTES # BLD MANUAL: 6 % (ref 0–11)
NEUTS BAND NFR BLD MANUAL: 10 % (ref 0–6)
NITRITE UR QL STRIP: POSITIVE
PH UR STRIP: 5.5 [PH] (ref 5–8)
PLATELET # BLD AUTO: 283 K/UL (ref 130–430)
POTASSIUM SERPL-SCNC: 3.6 MMOL/L (ref 3.5–5.1)
PROT UR QL STRIP: (no result)
PROTHROMBIN TIME: 10.9 SECS (ref 9.5–12.5)
RBC # BLD AUTO: 5.16 MIL/UL (ref 4.2–6.2)
RBC #/AREA URNS HPF: (no result) /HPF (ref 0–3)
SODIUM SERPLBLD-SCNC: 136 MMOL/L (ref 136–145)
SP GR UR STRIP: 1.01 (ref 1–1.03)
UROBILINOGEN UR STRIP-MCNC: 0.2 MG/DL (ref 0.2–1)
WBC # BLD AUTO: 23.5 K/UL (ref 4.8–10.8)
WBC #/AREA URNS HPF: (no result) /HPF (ref 0–3)

## 2018-10-19 VITALS — SYSTOLIC BLOOD PRESSURE: 129 MMHG

## 2018-10-19 VITALS — SYSTOLIC BLOOD PRESSURE: 98 MMHG

## 2018-10-19 VITALS — SYSTOLIC BLOOD PRESSURE: 127 MMHG

## 2018-10-19 VITALS — SYSTOLIC BLOOD PRESSURE: 119 MMHG

## 2018-10-19 VITALS — SYSTOLIC BLOOD PRESSURE: 125 MMHG

## 2018-10-19 VITALS — SYSTOLIC BLOOD PRESSURE: 132 MMHG

## 2018-10-19 LAB
ALBUMIN SERPL BCP-MCNC: 2.4 G/DL (ref 3.4–4.8)
ALT SERPL W P-5'-P-CCNC: 14 U/L (ref 12–78)
ANION GAP SERPL CALCULATED.3IONS-SCNC: 9 MMOL/L (ref 5–15)
AST SERPL W P-5'-P-CCNC: 10 U/L (ref 10–37)
BASOPHILS # BLD AUTO: 0.1 K/UL (ref 0–0.2)
BASOPHILS NFR BLD AUTO: 0.3 % (ref 0–2)
BILIRUB SERPL-MCNC: 0.6 MG/DL (ref 0–1)
BUN SERPL-MCNC: 16 MG/DL (ref 8–21)
CALCIUM SERPL-MCNC: 8.1 MG/DL (ref 8.4–11)
CHLORIDE SERPL-SCNC: 108 MMOL/L (ref 98–107)
CREAT SERPL-MCNC: 0.97 MG/DL (ref 0.55–1.3)
EOSINOPHIL # BLD AUTO: 0.2 K/UL (ref 0–0.4)
EOSINOPHIL NFR BLD AUTO: 1.1 % (ref 0–4)
ERYTHROCYTE [DISTWIDTH] IN BLOOD BY AUTOMATED COUNT: 13.3 % (ref 9–15)
GFR SERPL CREATININE-BSD FRML MDRD: 99 ML/MIN (ref 90–?)
GLUCOSE SERPL-MCNC: 100 MG/DL (ref 70–99)
HCT VFR BLD AUTO: 37.8 % (ref 36–54)
HGB BLD-MCNC: 12.8 G/DL (ref 14–18)
LYMPHOCYTES # BLD AUTO: 2.8 K/UL (ref 1–5.5)
LYMPHOCYTES NFR BLD AUTO: 13.2 % (ref 20.5–51.5)
MCH RBC QN AUTO: 29 PG (ref 27–31)
MCHC RBC AUTO-ENTMCNC: 34 % (ref 32–36)
MCV RBC AUTO: 86 FL (ref 79–98)
MONOCYTES # BLD MANUAL: 1.7 K/UL (ref 0–1)
MONOCYTES # BLD MANUAL: 8.1 % (ref 1.7–9.3)
NEUTROPHILS # BLD AUTO: 16.3 K/UL (ref 1.8–7.7)
NEUTROPHILS NFR BLD AUTO: 77.3 % (ref 40–70)
PLATELET # BLD AUTO: 234 K/UL (ref 130–430)
POTASSIUM SERPL-SCNC: 3.8 MMOL/L (ref 3.5–5.1)
RBC # BLD AUTO: 4.41 MIL/UL (ref 4.2–6.2)
SODIUM SERPLBLD-SCNC: 142 MMOL/L (ref 136–145)
WBC # BLD AUTO: 21.1 K/UL (ref 4.8–10.8)

## 2018-10-19 RX ADMIN — DEXTROSE SCH MLS/HR: 50 INJECTION, SOLUTION INTRAVENOUS at 21:02

## 2018-10-19 RX ADMIN — DEXTROSE SCH MLS/HR: 50 INJECTION, SOLUTION INTRAVENOUS at 09:44

## 2018-10-19 RX ADMIN — ACETAMINOPHEN PRN MG: 325 TABLET ORAL at 21:04

## 2018-10-19 RX ADMIN — Medication SCH MG: at 21:03

## 2018-10-19 RX ADMIN — POTASSIUM CHLORIDE, DEXTROSE MONOHYDRATE AND SODIUM CHLORIDE SCH MLS/HR: 150; 5; 450 INJECTION, SOLUTION INTRAVENOUS at 21:01

## 2018-10-19 RX ADMIN — ENOXAPARIN SODIUM SCH MG: 40 INJECTION SUBCUTANEOUS at 21:05

## 2018-10-19 RX ADMIN — LEVALBUTEROL SCH MG: 1.25 SOLUTION, CONCENTRATE RESPIRATORY (INHALATION) at 15:20

## 2018-10-19 RX ADMIN — LEVALBUTEROL SCH MG: 1.25 SOLUTION, CONCENTRATE RESPIRATORY (INHALATION) at 23:49

## 2018-10-19 RX ADMIN — POTASSIUM CHLORIDE, DEXTROSE MONOHYDRATE AND SODIUM CHLORIDE SCH MLS/HR: 150; 5; 450 INJECTION, SOLUTION INTRAVENOUS at 03:40

## 2018-10-19 RX ADMIN — FLUTICASONE PROPIONATE SCH GM: 50 SPRAY, METERED NASAL at 21:02

## 2018-10-19 RX ADMIN — Medication SCH MG: at 19:05

## 2018-10-20 VITALS — SYSTOLIC BLOOD PRESSURE: 122 MMHG

## 2018-10-20 VITALS — SYSTOLIC BLOOD PRESSURE: 143 MMHG

## 2018-10-20 VITALS — SYSTOLIC BLOOD PRESSURE: 118 MMHG

## 2018-10-20 VITALS — SYSTOLIC BLOOD PRESSURE: 144 MMHG

## 2018-10-20 VITALS — SYSTOLIC BLOOD PRESSURE: 123 MMHG

## 2018-10-20 LAB
ANION GAP SERPL CALCULATED.3IONS-SCNC: 7 MMOL/L (ref 5–15)
BASOPHILS # BLD AUTO: 0 K/UL (ref 0–0.2)
BASOPHILS NFR BLD AUTO: 0.3 % (ref 0–2)
BUN SERPL-MCNC: 8 MG/DL (ref 8–21)
CALCIUM SERPL-MCNC: 8.2 MG/DL (ref 8.4–11)
CHLORIDE SERPL-SCNC: 108 MMOL/L (ref 98–107)
CREAT SERPL-MCNC: 0.76 MG/DL (ref 0.55–1.3)
EOSINOPHIL # BLD AUTO: 0.5 K/UL (ref 0–0.4)
EOSINOPHIL NFR BLD AUTO: 3.9 % (ref 0–4)
ERYTHROCYTE [DISTWIDTH] IN BLOOD BY AUTOMATED COUNT: 13.2 % (ref 9–15)
GFR SERPL CREATININE-BSD FRML MDRD: 132 ML/MIN (ref 90–?)
GLUCOSE SERPL-MCNC: 104 MG/DL (ref 70–99)
HCT VFR BLD AUTO: 35.3 % (ref 36–54)
HGB BLD-MCNC: 12 G/DL (ref 14–18)
LYMPHOCYTES # BLD AUTO: 1.4 K/UL (ref 1–5.5)
LYMPHOCYTES NFR BLD AUTO: 11.9 % (ref 20.5–51.5)
MCH RBC QN AUTO: 30 PG (ref 27–31)
MCHC RBC AUTO-ENTMCNC: 34 % (ref 32–36)
MCV RBC AUTO: 86 FL (ref 79–98)
MONOCYTES # BLD MANUAL: 1.2 K/UL (ref 0–1)
MONOCYTES # BLD MANUAL: 9.7 % (ref 1.7–9.3)
NEUTROPHILS # BLD AUTO: 8.9 K/UL (ref 1.8–7.7)
NEUTROPHILS NFR BLD AUTO: 74.2 % (ref 40–70)
PLATELET # BLD AUTO: 211 K/UL (ref 130–430)
POTASSIUM SERPL-SCNC: 3.6 MMOL/L (ref 3.5–5.1)
RBC # BLD AUTO: 4.08 MIL/UL (ref 4.2–6.2)
SODIUM SERPLBLD-SCNC: 142 MMOL/L (ref 136–145)
WBC # BLD AUTO: 12 K/UL (ref 4.8–10.8)

## 2018-10-20 RX ADMIN — Medication SCH MG: at 20:42

## 2018-10-20 RX ADMIN — LOSARTAN POTASSIUM SCH MG: 50 TABLET, FILM COATED ORAL at 08:51

## 2018-10-20 RX ADMIN — POTASSIUM CHLORIDE, DEXTROSE MONOHYDRATE AND SODIUM CHLORIDE SCH MLS/HR: 150; 5; 450 INJECTION, SOLUTION INTRAVENOUS at 13:32

## 2018-10-20 RX ADMIN — FLUTICASONE PROPIONATE SCH GM: 50 SPRAY, METERED NASAL at 20:46

## 2018-10-20 RX ADMIN — FLUTICASONE PROPIONATE SCH GM: 50 SPRAY, METERED NASAL at 08:54

## 2018-10-20 RX ADMIN — Medication SCH MG: at 19:27

## 2018-10-20 RX ADMIN — ENOXAPARIN SODIUM SCH MG: 40 INJECTION SUBCUTANEOUS at 20:44

## 2018-10-20 RX ADMIN — DEXTROSE SCH MLS/HR: 50 INJECTION, SOLUTION INTRAVENOUS at 20:42

## 2018-10-20 RX ADMIN — POTASSIUM CHLORIDE, DEXTROSE MONOHYDRATE AND SODIUM CHLORIDE SCH MLS/HR: 150; 5; 450 INJECTION, SOLUTION INTRAVENOUS at 06:29

## 2018-10-20 RX ADMIN — DEXTROSE SCH MLS/HR: 50 INJECTION, SOLUTION INTRAVENOUS at 08:48

## 2018-10-20 RX ADMIN — Medication SCH MG: at 08:51

## 2018-10-20 RX ADMIN — LEVALBUTEROL SCH MG: 1.25 SOLUTION, CONCENTRATE RESPIRATORY (INHALATION) at 15:19

## 2018-10-20 RX ADMIN — LEVOTHYROXINE SODIUM SCH MG: 150 TABLET ORAL at 06:34

## 2018-10-20 RX ADMIN — LEVALBUTEROL SCH MG: 1.25 SOLUTION, CONCENTRATE RESPIRATORY (INHALATION) at 07:25

## 2018-10-20 RX ADMIN — LEVALBUTEROL SCH MG: 1.25 SOLUTION, CONCENTRATE RESPIRATORY (INHALATION) at 23:26

## 2018-10-21 VITALS — SYSTOLIC BLOOD PRESSURE: 142 MMHG

## 2018-10-21 VITALS — SYSTOLIC BLOOD PRESSURE: 133 MMHG

## 2018-10-21 VITALS — SYSTOLIC BLOOD PRESSURE: 138 MMHG

## 2018-10-21 VITALS — SYSTOLIC BLOOD PRESSURE: 135 MMHG

## 2018-10-21 VITALS — SYSTOLIC BLOOD PRESSURE: 140 MMHG

## 2018-10-21 RX ADMIN — FLUTICASONE PROPIONATE SCH GM: 50 SPRAY, METERED NASAL at 20:38

## 2018-10-21 RX ADMIN — Medication SCH MG: at 20:38

## 2018-10-21 RX ADMIN — LEVALBUTEROL SCH MG: 1.25 SOLUTION, CONCENTRATE RESPIRATORY (INHALATION) at 23:06

## 2018-10-21 RX ADMIN — Medication SCH MG: at 08:02

## 2018-10-21 RX ADMIN — DEXTROSE SCH MLS/HR: 50 INJECTION, SOLUTION INTRAVENOUS at 08:01

## 2018-10-21 RX ADMIN — Medication SCH MG: at 17:42

## 2018-10-21 RX ADMIN — LEVOTHYROXINE SODIUM SCH MG: 150 TABLET ORAL at 06:18

## 2018-10-21 RX ADMIN — DEXTROSE SCH MLS/HR: 50 INJECTION, SOLUTION INTRAVENOUS at 20:38

## 2018-10-21 RX ADMIN — FLUTICASONE PROPIONATE SCH GM: 50 SPRAY, METERED NASAL at 10:24

## 2018-10-21 RX ADMIN — LEVALBUTEROL SCH MG: 1.25 SOLUTION, CONCENTRATE RESPIRATORY (INHALATION) at 07:35

## 2018-10-21 RX ADMIN — LOSARTAN POTASSIUM SCH MG: 50 TABLET, FILM COATED ORAL at 08:02

## 2018-10-21 RX ADMIN — LEVALBUTEROL SCH MG: 1.25 SOLUTION, CONCENTRATE RESPIRATORY (INHALATION) at 15:35

## 2018-10-21 RX ADMIN — POTASSIUM CHLORIDE, DEXTROSE MONOHYDRATE AND SODIUM CHLORIDE SCH MLS/HR: 150; 5; 450 INJECTION, SOLUTION INTRAVENOUS at 01:45

## 2018-10-21 RX ADMIN — ENOXAPARIN SODIUM SCH MG: 40 INJECTION SUBCUTANEOUS at 20:39

## 2018-10-22 VITALS — SYSTOLIC BLOOD PRESSURE: 148 MMHG

## 2018-10-22 VITALS — SYSTOLIC BLOOD PRESSURE: 137 MMHG

## 2018-10-22 VITALS — SYSTOLIC BLOOD PRESSURE: 122 MMHG

## 2018-10-22 VITALS — SYSTOLIC BLOOD PRESSURE: 147 MMHG

## 2018-10-22 VITALS — SYSTOLIC BLOOD PRESSURE: 132 MMHG

## 2018-10-22 LAB
ANION GAP SERPL CALCULATED.3IONS-SCNC: 7 MMOL/L (ref 5–15)
BASOPHILS # BLD AUTO: 0 K/UL (ref 0–0.2)
BASOPHILS NFR BLD AUTO: 0.5 % (ref 0–2)
BUN SERPL-MCNC: 13 MG/DL (ref 8–21)
CALCIUM SERPL-MCNC: 8.8 MG/DL (ref 8.4–11)
CHLORIDE SERPL-SCNC: 105 MMOL/L (ref 98–107)
CREAT SERPL-MCNC: 0.74 MG/DL (ref 0.55–1.3)
EOSINOPHIL # BLD AUTO: 0.3 K/UL (ref 0–0.4)
EOSINOPHIL NFR BLD AUTO: 4.2 % (ref 0–4)
ERYTHROCYTE [DISTWIDTH] IN BLOOD BY AUTOMATED COUNT: 13 % (ref 9–15)
GFR SERPL CREATININE-BSD FRML MDRD: 136 ML/MIN (ref 90–?)
GLUCOSE SERPL-MCNC: 115 MG/DL (ref 70–99)
HCT VFR BLD AUTO: 40 % (ref 36–54)
HGB BLD-MCNC: 13.6 G/DL (ref 14–18)
LYMPHOCYTES # BLD AUTO: 1 K/UL (ref 1–5.5)
LYMPHOCYTES NFR BLD AUTO: 13.2 % (ref 20.5–51.5)
MCH RBC QN AUTO: 29 PG (ref 27–31)
MCHC RBC AUTO-ENTMCNC: 34 % (ref 32–36)
MCV RBC AUTO: 86 FL (ref 79–98)
MONOCYTES # BLD MANUAL: 0.8 K/UL (ref 0–1)
MONOCYTES # BLD MANUAL: 10.9 % (ref 1.7–9.3)
NEUTROPHILS # BLD AUTO: 5.6 K/UL (ref 1.8–7.7)
NEUTROPHILS NFR BLD AUTO: 71.2 % (ref 40–70)
PLATELET # BLD AUTO: 287 K/UL (ref 130–430)
POTASSIUM SERPL-SCNC: 3.7 MMOL/L (ref 3.5–5.1)
RBC # BLD AUTO: 4.66 MIL/UL (ref 4.2–6.2)
SODIUM SERPLBLD-SCNC: 136 MMOL/L (ref 136–145)
WBC # BLD AUTO: 7.7 K/UL (ref 4.8–10.8)

## 2018-10-22 RX ADMIN — Medication SCH MG: at 08:35

## 2018-10-22 RX ADMIN — DEXTROSE SCH MLS/HR: 50 INJECTION, SOLUTION INTRAVENOUS at 20:17

## 2018-10-22 RX ADMIN — LEVALBUTEROL SCH MG: 1.25 SOLUTION, CONCENTRATE RESPIRATORY (INHALATION) at 15:21

## 2018-10-22 RX ADMIN — POTASSIUM CHLORIDE, DEXTROSE MONOHYDRATE AND SODIUM CHLORIDE SCH MLS/HR: 150; 5; 450 INJECTION, SOLUTION INTRAVENOUS at 08:34

## 2018-10-22 RX ADMIN — LEVALBUTEROL SCH MG: 1.25 SOLUTION, CONCENTRATE RESPIRATORY (INHALATION) at 07:52

## 2018-10-22 RX ADMIN — LEVALBUTEROL SCH MG: 1.25 SOLUTION, CONCENTRATE RESPIRATORY (INHALATION) at 23:19

## 2018-10-22 RX ADMIN — Medication SCH MG: at 17:27

## 2018-10-22 RX ADMIN — FLUTICASONE PROPIONATE SCH SPR: 50 SPRAY, METERED NASAL at 08:43

## 2018-10-22 RX ADMIN — LOSARTAN POTASSIUM SCH MG: 50 TABLET, FILM COATED ORAL at 08:35

## 2018-10-22 RX ADMIN — Medication SCH MG: at 20:16

## 2018-10-22 RX ADMIN — ACETAMINOPHEN PRN MG: 325 TABLET ORAL at 04:29

## 2018-10-22 RX ADMIN — POTASSIUM CHLORIDE, DEXTROSE MONOHYDRATE AND SODIUM CHLORIDE SCH MLS/HR: 150; 5; 450 INJECTION, SOLUTION INTRAVENOUS at 02:35

## 2018-10-22 RX ADMIN — DEXTROSE SCH MLS/HR: 50 INJECTION, SOLUTION INTRAVENOUS at 08:42

## 2018-10-22 RX ADMIN — FLUTICASONE PROPIONATE SCH SPR: 50 SPRAY, METERED NASAL at 20:16

## 2018-10-22 RX ADMIN — ENOXAPARIN SODIUM SCH MG: 40 INJECTION SUBCUTANEOUS at 20:21

## 2018-10-22 RX ADMIN — LEVOTHYROXINE SODIUM SCH MG: 150 TABLET ORAL at 06:01

## 2018-10-23 VITALS — SYSTOLIC BLOOD PRESSURE: 115 MMHG

## 2018-10-23 VITALS — SYSTOLIC BLOOD PRESSURE: 124 MMHG

## 2018-10-23 VITALS — SYSTOLIC BLOOD PRESSURE: 139 MMHG

## 2018-10-23 VITALS — SYSTOLIC BLOOD PRESSURE: 151 MMHG

## 2018-10-23 RX ADMIN — POTASSIUM CHLORIDE, DEXTROSE MONOHYDRATE AND SODIUM CHLORIDE SCH MLS/HR: 150; 5; 450 INJECTION, SOLUTION INTRAVENOUS at 05:20

## 2018-10-23 RX ADMIN — LOSARTAN POTASSIUM SCH MG: 50 TABLET, FILM COATED ORAL at 08:07

## 2018-10-23 RX ADMIN — Medication SCH MG: at 08:07

## 2018-10-23 RX ADMIN — LEVALBUTEROL SCH MG: 1.25 SOLUTION, CONCENTRATE RESPIRATORY (INHALATION) at 08:35

## 2018-10-23 RX ADMIN — FLUTICASONE PROPIONATE SCH SPR: 50 SPRAY, METERED NASAL at 08:05

## 2018-10-23 RX ADMIN — LEVOTHYROXINE SODIUM SCH MG: 150 TABLET ORAL at 06:01

## 2018-10-23 RX ADMIN — DEXTROSE SCH MLS/HR: 50 INJECTION, SOLUTION INTRAVENOUS at 08:04

## 2019-03-08 ENCOUNTER — HOSPITAL ENCOUNTER (EMERGENCY)
Dept: HOSPITAL 4 - SED | Age: 69
Discharge: HOME | End: 2019-03-08
Payer: COMMERCIAL

## 2019-03-08 VITALS — BODY MASS INDEX: 24.92 KG/M2 | HEIGHT: 73 IN | WEIGHT: 188 LBS

## 2019-03-08 VITALS — SYSTOLIC BLOOD PRESSURE: 119 MMHG

## 2019-03-08 VITALS — SYSTOLIC BLOOD PRESSURE: 118 MMHG

## 2019-03-08 DIAGNOSIS — F03.90: ICD-10-CM

## 2019-03-08 DIAGNOSIS — E03.9: ICD-10-CM

## 2019-03-08 DIAGNOSIS — Z79.899: ICD-10-CM

## 2019-03-08 DIAGNOSIS — L02.411: Primary | ICD-10-CM

## 2019-03-08 DIAGNOSIS — I10: ICD-10-CM

## 2019-05-24 ENCOUNTER — HOSPITAL ENCOUNTER (INPATIENT)
Dept: HOSPITAL 36 - ER | Age: 69
LOS: 14 days | Discharge: NURSING, REHAB OR LONG-TERM CARE | DRG: 885 | End: 2019-06-07
Attending: PSYCHIATRY & NEUROLOGY | Admitting: PSYCHIATRY & NEUROLOGY
Payer: MEDICARE

## 2019-05-24 VITALS — DIASTOLIC BLOOD PRESSURE: 71 MMHG | SYSTOLIC BLOOD PRESSURE: 135 MMHG

## 2019-05-24 DIAGNOSIS — E03.9: ICD-10-CM

## 2019-05-24 DIAGNOSIS — I10: ICD-10-CM

## 2019-05-24 DIAGNOSIS — K21.9: ICD-10-CM

## 2019-05-24 DIAGNOSIS — M19.90: ICD-10-CM

## 2019-05-24 DIAGNOSIS — Z91.81: ICD-10-CM

## 2019-05-24 DIAGNOSIS — K27.9: ICD-10-CM

## 2019-05-24 DIAGNOSIS — Z22.322: ICD-10-CM

## 2019-05-24 DIAGNOSIS — J30.9: ICD-10-CM

## 2019-05-24 DIAGNOSIS — F72: ICD-10-CM

## 2019-05-24 DIAGNOSIS — F29: Primary | ICD-10-CM

## 2019-05-24 DIAGNOSIS — F03.90: ICD-10-CM

## 2019-05-24 LAB
ALBUMIN SERPL-MCNC: 4.4 GM/DL (ref 4.2–5.5)
ALBUMIN/GLOB SERPL: 1.6 {RATIO} (ref 1–1.8)
ALP SERPL-CCNC: 106 U/L (ref 34–104)
ALT SERPL-CCNC: 19 U/L (ref 7–52)
AMPHET UR-MCNC: NEGATIVE NG/ML
ANION GAP SERPL CALC-SCNC: 12.6 MMOL/L (ref 7–16)
APAP SERPL-MCNC: < 10 UG/ML (ref 10–30)
APPEARANCE UR: CLEAR
AST SERPL-CCNC: 15 U/L (ref 13–39)
BACTERIA #/AREA URNS HPF: (no result) /HPF
BARBITURATES UR-MCNC: NEGATIVE UG/ML
BASOPHILS # BLD AUTO: 0 TH/CUMM (ref 0–0.2)
BASOPHILS NFR BLD AUTO: 0.1 % (ref 0–2)
BENZODIAZEPINES PNL UR: POSITIVE
BILIRUB SERPL-MCNC: 0.5 MG/DL (ref 0.3–1)
BILIRUB UR-MCNC: NEGATIVE MG/DL
BUN SERPL-MCNC: 12 MG/DL (ref 7–25)
CALCIUM SERPL-MCNC: 9.5 MG/DL (ref 8.6–10.3)
CANNABINOIDS SERPL QL CFM: NEGATIVE
CHLORIDE SERPL-SCNC: 104 MEQ/L (ref 98–107)
CHOLEST SERPL-MCNC: 167 MG/DL (ref ?–200)
CO2 SERPL-SCNC: 25 MEQ/L (ref 21–31)
COCAINE METAB.OTHER UR-MCNC: NEGATIVE NG/ML
COLOR UR: YELLOW
CREAT SERPL-MCNC: 0.8 MG/DL (ref 0.7–1.3)
EOSINOPHIL # BLD AUTO: 0.4 TH/CMM (ref 0.1–0.4)
EOSINOPHIL NFR BLD AUTO: 4.7 % (ref 0–5)
EPI CELLS URNS QL MICRO: (no result) /LPF
ERYTHROCYTE [DISTWIDTH] IN BLOOD BY AUTOMATED COUNT: 13.2 % (ref 11.5–20)
GLOBULIN SER-MCNC: 2.8 GM/DL
GLUCOSE SERPL-MCNC: 113 MG/DL (ref 70–105)
GLUCOSE UR STRIP-MCNC: NEGATIVE MG/DL
HCT VFR BLD CALC: 46.9 % (ref 41–60)
HDLC SERPL-MCNC: 58 MG/DL (ref 23–92)
HGB BLD-MCNC: 15.5 GM/DL (ref 12–16)
KETONES UR STRIP-MCNC: NEGATIVE MG/DL
LEUKOCYTE ESTERASE UR-ACNC: NEGATIVE
LYMPHOCYTE AB SER FC-ACNC: 2 TH/CMM (ref 1.5–3)
LYMPHOCYTES NFR BLD AUTO: 26.1 % (ref 20–50)
MCH RBC QN AUTO: 28.1 PG (ref 27–31)
MCHC RBC AUTO-ENTMCNC: 33 PG (ref 28–36)
MCV RBC AUTO: 85.3 FL (ref 80–99)
METHADONE UR CFM-MCNC: NEGATIVE NG/ML
METHAMPHET UR QL: NEGATIVE
MICRO URNS: YES
MONOCYTES # BLD AUTO: 0.6 TH/CMM (ref 0.3–1)
MONOCYTES NFR BLD AUTO: 7.3 % (ref 2–10)
NEUTROPHILS # BLD: 4.8 TH/CMM (ref 1.8–8)
NEUTROPHILS NFR BLD AUTO: 61.8 % (ref 40–80)
NITRITE UR QL STRIP: NEGATIVE
OPIATES UR QL: NEGATIVE
PCP UR-MCNC: NEGATIVE UG/L
PH UR STRIP: 7 [PH] (ref 4.6–8)
PLATELET # BLD: 290 TH/CMM (ref 150–400)
POTASSIUM SERPL-SCNC: 3.6 MEQ/L (ref 3.5–5.1)
PROT UR STRIP-MCNC: NEGATIVE MG/DL
RBC # BLD AUTO: 5.5 MIL/CMM (ref 3.8–5.8)
RBC # UR STRIP: NEGATIVE /UL
RBC #/AREA URNS HPF: (no result) /HPF (ref 0–5)
SODIUM SERPL-SCNC: 138 MEQ/L (ref 136–145)
TRICYCLICS UR QL: POSITIVE
TRIGL SERPL-MCNC: 136 MG/DL (ref ?–150)
URINALYSIS COMPLETE PNL UR: (no result)
UROBILINOGEN UR STRIP-ACNC: 0.2 E.U./DL (ref 0.2–1)
WBC # BLD AUTO: 7.8 TH/CMM (ref 4.8–10.8)
WBC #/AREA URNS HPF: (no result) /HPF (ref 0–5)

## 2019-05-24 PROCEDURE — Z7610: HCPCS

## 2019-05-24 PROCEDURE — G0410 GRP PSYCH PARTIAL HOSP 45-50: HCPCS

## 2019-05-24 NOTE — ED PHYSICIAN CHART
ED Chief Complaint/HPI





- Patient Information


Date Seen:: 05/24/19


Time Seen:: 09:30


Chief Complaint:: Agitation


History of Present Illness:: 





onset x 3 days of agitation and aggressive behavior; no report of trauma, SIs, H

/As, S/T, neck pain, cough, C/P, SOB, Abd. pain, or urinary s/s


Allergies:: 


 Allergies











Allergy/AdvReac Type Severity Reaction Status Date / Time


 


No Known Allergies Allergy   Verified 05/24/19 09:38











Vitals:: 


 Vital Signs - 8 hr











  05/24/19





  09:32


 


Temp 98.9 F


 


HR 89


 


RR 16


 


/79


 


O2 Sat % 98











Historian:: Patient, Friend


Review:: Nurse's Note Reviewed, Old Chart Reviewed





ED Review of Systems





- Review of Systems


General/Constitutional: No fever, No chills, No weight loss, No weakness, No 

diaphoresis, No edema, No loss of appetite


Skin: No skin lesions, No rash, No bruising


Head: No headache, No light-headedness


Eyes: No loss of vision, No pain, No diplopia


ENT: No earache, No nasal drainage, No sore throat, No tinnitus


Neck: No neck pain, No swelling, No thyromegaly, No stiffness, No mass noted


Cardio Vascular: No chest pain, No palpitations, No PND, No orthopnea, No edema


Pulmonary: No SOB, No cough, No sputum, No wheezing


GI: No nausea, No vomiting, No diarrhea, No pain, No melena, No hematochezia, 

No constipation, No hematemesis


G/U: No dysuria, No frequency, No hematuria, No nacturia


Musculoskeletal: No bone or joint pain, No back pain, No muscle pain


Endocrine: No polyuria, No polydipsia


Psychiatric: Prior psych history, Depression, Anxiety, No suicidal ideation, No 

homicidal ideation, No auditory hallucination, No visual hallucination


Hematopoietic: No bruising, No lymphadenopathy


Allergic/Immuno: No urticaria, No angioedema


Neurological: No syncope, No focal symptoms, No weakness, No paresthesia, No 

headache, No seizure, No dizziness, Confusion, No vertigo





ED Past Medical History





- Past Medical History


Obtainable: Yes


Past Medical History: HTN, PUD/GERD, Thyroid disorder, Dementia


Family History: HTN


Social History: Non Smoker, No Alcohol, No Drug Use, Single, Care Facility


Surgical History: None


Psychiatricy History: Depression, Schizophrenia, Bipolar, Dementia


Medication: Reviewed





Family Medical History





- Family Member


  ** Mother


History Unknown: Yes


Ethnicity: Unknown


Living Status: Unknown


Hx Family Cancer:  (unknown)


Hx Family Coronary Artery Disease:  (unknown)


Hx Family Congestive Heart Failure:  (unknown)


Hx Family Hypertension:  (unknown)


Hx Family Stroke:  (unknown)


Hx Family Diabetes:  (unknown)


Hx Family Seizures:  (unknown)


Hx Family Dementia:  (unknown)


Hx Family AIDS:  (unknown)


Hx Family COPD:  (unknown)


Hx Family Hepatitis:  (unknown)


Hx Family Psychiatric Problems:  (unknown)


Hx Family Tuberculosis:  (unknown)





ED Physical Exam





- Physical Examination


General/Constitutional: Awake, Well-developed, well-nourished, Alert, No 

distress, GCS 15, Non-toxic appearing, Ambulatory


Head: Atraumatic


Eyes: Lids, conjuctiva normal, PERRL, EOMI


Skin: Nl inspection, No rash, No skin lesions, No ecchymosis, Well hydrated, No 

lymphadenopathy


ENMT: External ears, nose nl, TM canals nl, Nasal exam nl, Lips, teeth, gums nl

, Oropharynx nl, Tonsils nl


Neck: Nontender, Full ROM w/o pain, No JVD, No nuchal rigidity, No bruit, No 

mass, No stridor


Other Neck comments:: 





supple; no meningeal signs; no cervical tenderness; no bruits


Respiratory: Nl effort/Exclusion, Clear to Auscultation, No Wheeze/Rhonchi/Rales


Cardio Vascular: RRR, No murmur, gallop, rubs, NL S1 S2, Carotid/Femoral/Distal 

pulses equal bilaterally


GI: No tenderness/rebounding/guarding, No organomegaly, No hernia, Normal BS's, 

Nondistended, No mass/bruits, No McBurney tenderness, Rectum exam nl


Other GI comments:: 





no pulsatile masses


: No CVA tenderness


Extremities: No tenderness or effusion, Full ROM, normal strength in all 

extremities, No edema, Normal digits & nails


Neuro/Psych: Alert/oriented, DTR's symmetric, Normal sensory exam, Normal motor 

strength, Judgement/insight normal, Mood normal, Normal gait, No focal deficits


Other Neuro/Psych comments:: 





+ Psychomotor Agitation; no SIs; Mood/Affect: Labile


Misc: Normal back, No paraspinal tenderness





ED Labs/Radiology/EKG Results





- Lab Results


Comments:: 





Reviewed





- EKG Interpretations


EKG Time:: 09:55


Rate & Rhythm: 86; NSR


Comments:: 





non-specific st-t changes





ED Septic Shock





- .


Is Septic Shock (SBP<90, OR Lactate>4 mmol\L) present?: No





- <6hrs of presentation:


Vital Signs: 


 Vital Signs - 8 hr











  05/24/19





  09:32


 


Temp 98.9 F


 


HR 89


 


RR 16


 


/79


 


O2 Sat % 98














ED Reassessment (Disposition)





- Reassessment


Reassessment Condition:: Improved





- Diagnosis


Diagnosis:: 





Agitation; Medical Clearance; Bipolar Disorder





- Aftercare/Follow up Instructions


Aftercare/Follow-Up Instructions:: Counseled pt regarding lab results/diagnosis 

& need follow up, Counseled pt & family regarding lab results/diagnosis & need 

follow up





- Patient Disposition


Discharge/Transfer:: Acute Care w/in this hosp


Admitted to:: [facility]


Condition at Disposition:: Stable, Improved

## 2019-05-25 LAB — HBA1C BLD-MCNC: 5.6 % (ref 4.8–5.6)

## 2019-05-25 RX ADMIN — LEVOTHYROXINE SODIUM SCH MG: 75 TABLET ORAL at 06:37

## 2019-05-25 RX ADMIN — FLUTICASONE PROPIONATE SCH SPR: 50 SPRAY, METERED NASAL at 08:36

## 2019-05-25 NOTE — PSYCHIATRIC EVALUATION
DATE OF SERVICE:  



PSYCHIATRIC INITIAL EVALUATION AND MENTAL STATUS EXAM



PATIENT'S AGE:  69-year-old.



SEX:  Male.



PHYSICIAN:  Dr. Coyle.



CHIEF COMPLAINT:  Hurting himself.



HISTORY OF PRESENT ILLNESS:  The patient is a 69-year-old male who lives in

[facility].  The patient has been

extremely agitated and aggressive and in irritable mood.  The patient has been

hitting himself in the head all the time and has been talking constantly

nonsense and has been entering other patient's rooms, waking them up in the

middle of the night.  Also, has difficulty following any directions.  The

patient also is angry and has been talking to self and actively hallucinating. 

I saw the patient in the group home and adjusted his medications and added

Klonopin and increased Seroquel, yet he has the same behavior and staff was not

able to handle the patient and was sent to the hospital for evaluation and

adjusting medications.



The patient is still agitated and in irritable mood and has difficulty following

directions.  Also, still hitting self.



PAST PSYCHIATRIC HISTORY:  The patient has a history of mental retardation and

psychosis and also multiple hospitalizations.



PAST MEDICAL HISTORY:  The patient has hypertension as well as peptic ulcer

disease, gastroesophageal reflux disease and thyroid disorder.



FAMILY HISTORY:  None psychiatric but has history of hypertension.



SOCIAL HISTORY:  The patient lives in [facility]

[facility].  He belongs to [facility].  The patient does not smoke

cigarette, drink alcohol or use any street drugs.  The patient is single, never

, and has no children.



ALLERGIES:  No known allergies.



MENTAL STATUS EXAMINATION:  The patient appears older than stated age. 

Confused.  Anxious.  Irritable mood.  Thought processes are with poverty of

speech and mumbling and unable to understand.  Difficulty expressing self.  The

patient did not answer questions regarding hallucinations or delusions, but

actively responding.  The patient did not answer questions regarding suicide or

homicide, but easily agitated.  The patient is alert, but seems to be

disoriented to time, place, person, and situation.  The patient seems to be of

moderate-to-severe mental retardation, based on his verbal ability.



ASSESSMENT:

PRIMARY DIAGNOSIS:  Unspecified psychosis.



SECONDARY DIAGNOSIS:  Mental retardation, moderate to severe.



TREATMENT PLAN:  We will monitor the patient's behavior and condition closely. 

Also, adjusting psychotropic medications and work on behavioral modification.



ESTIMATED LENGTH OF STAY:  5-7 days.



THE PATIENT'S STRENGTHS AND WEAKNESSES:  The patient seems to be in relatively

fair health and have support from people in the group home.  Weaknesses are his

poor impulse control and his mental abilities.



AFTER DISCHARGE PLAN:  The patient will return to the group home and will

continue treating him there.



CRITERIA FOR DISCHARGE:  Better impulse control and stabilizing psychotropic

medications.





DD: 05/25/2019 16:06

DT: 05/25/2019 17:38

[facility]# [id number]  [id number]

## 2019-05-26 RX ADMIN — LEVOTHYROXINE SODIUM SCH MG: 75 TABLET ORAL at 06:32

## 2019-05-26 RX ADMIN — OLANZAPINE SCH MG: 5 TABLET, ORALLY DISINTEGRATING ORAL at 20:40

## 2019-05-26 RX ADMIN — FLUTICASONE PROPIONATE SCH SPR: 50 SPRAY, METERED NASAL at 09:17

## 2019-05-26 NOTE — HISTORY & PHYSICAL
ADMIT DATE:  



PATIENT IDENTIFICATION:  A 69-year-old male.



REQUESTING PHYSICIAN:  Dr. Abner Coyle.



PRESENTING COMPLAINT:  None.



HISTORY SOURCE:  Talking to the staff as well as reviewing the chart.



HISTORY OF PRESENT ILLNESS:  A 69-year-old  male who resides at

[facility], has been followed by Dr. Coyle and myself, brought in to

the Emergency Room for evaluation of increasing agitation, hitting himself and

trying to hit others.  The patient was seen by ER MD and subsequently admitted

to the hospital for further treatment.



PAST MEDICAL HISTORY:  Remarkable for:

1.  Developmental disability.

2.  Dementia.

3.  Hypothyroidism.

4.  Hypertension.

5.  Psychotic disorder.

6.  Degenerative joint disease.

7.  Allergic rhinosinusitis.



MEDICATIONS AT HOME:  Have been reviewed and reconciled appropriately.



ALLERGIES:  None.



SOCIAL HISTORY:  Lives in assisted living facility.  No history of smoking

cigarette, alcohol use or drug use.



FAMILY MEDICAL HISTORY:  Unknown.



REVIEW OF SYSTEMS:  Unable to obtain meaningful history from the patient.



PHYSICAL EXAMINATION:

GENERAL:  The patient is alert, awake, lying in the bed without any acute

distress.

VITAL SIGNS:  Temperature 98, pulse is 64, respiratory rate 16, blood pressure

136/80.

SKIN:  Warm to touch.  Adequate skin turgor.

HEENT:  Normocephalic, atraumatic.  Extraocular muscles are intact.  Tongue was

pink and coated.  No oral lesion noted.  Multiple absent teeth noted.

NECK:  Supple, no JVD.  No hepatojugular reflex.  No lymphadenopathy,

thyromegaly or carotid bruit.

HEART:  Both heart sounds are regular.  No S3, no S4, no murmur.

CHEST:  Lung equal in expansion, no expiratory wheezing.

ABDOMEN:  Soft.  No guarding, no rigidity.  Liver, spleen not palpable.  No

palpable mass.

EXTREMITIES:  No edema, no cyanosis.  Peripheral pulses +1.  No calf tenderness

noted.

EXTERNAL GENITAL AND INGUINAL SITES:  Not done.

NEUROLOGIC:  Alert and awake, follows commands.  Moving upper and lower

extremity without any difficulty.



AVAILABLE DIAGNOSTIC DATA:  Performed in the Emergency Room, which includes a

white count of 7.8, hemoglobin 15.5, platelet count 290.  Sodium 130, potassium

3.6, chloride 104, CO2 of 25, anion gap 12.6, BUN and creatinine 12 and 0.8,

glucose 113, alkaline phosphatase 106.  Troponin less than 0.01.  Urinalysis

negative.  Urine tox screen is positive for tricyclics and benzos.  RPR was

unremarkable.  MRSA colonization is currently positive.



CLINICAL IMPRESSION:

1.  Developmental disability.

2.  Dementia.

3.  Hypertension.

4.  Hypothyroidism.

5.  Degenerative joint disease.

6.  High risk for fall.

7.  Psychotic disorder.

8.  Allergic rhinosinusitis.



PLAN:  The patient is admitted by Dr. Coyle to [facility].  At this

time, psychiatric evaluation and management will be deferred to psychiatrist. 

The patient will be placed on his home medication as the patient was receiving,

which include levothyroxine for hypothyroidism, Claritin and Flonase for

allergic rhinosinusitis.  Continue to provide amlodipine for hypertension.  For

asthma, the patient can use albuterol inhaler as needed.  Symptoms management

and medication management will be provided.  General nursing care will be given.

 The patient will be continued on losartan for hypertension as well.  The

patient will be continued to be followed by me during his stay in the hospital. 

Fall precautions will be continued.





DD: 05/26/2019 13:50

DT: 05/26/2019 14:26

JOB# [id number]  [id number]

## 2019-05-27 RX ADMIN — FLUTICASONE PROPIONATE SCH SPR: 50 SPRAY, METERED NASAL at 09:36

## 2019-05-27 RX ADMIN — OLANZAPINE SCH MG: 5 TABLET, ORALLY DISINTEGRATING ORAL at 21:55

## 2019-05-27 RX ADMIN — LEVOTHYROXINE SODIUM SCH MG: 75 TABLET ORAL at 06:41

## 2019-05-27 NOTE — PROGRESS NOTES
DATE:  05/26/2019



PSYCHIATRIC PROGRESS NOTE



SUBJECTIVE:  Chart reviewed and the patient interviewed.  Also discussed the

patient's condition with the staff and reviewed records and labs.  The patient

is still extremely agitated and earlier today he was hitting himself in the head

strongly and kept getting into other the patient's room in the middle of the

night and he has not been able to sleep well.  The patient also is still easily

agitated and has difficulty following directions because of his mental abilities

and his agitation.  The patient also although taking Klonopin and Seroquel, it

seems is not helping much with his agitation and self-destructive behavior.



ASSESSMENT:  The patient is still agitated and in irritable mood and aggressive

and can be dangerous to self.



TREATMENT PLAN:  We will continue monitoring behavior closely and working on

behavioral modification.  Also, we will decrease Klonopin to 1 mg in the morning

and 2 mg at bedtime and also decrease Seroquel to 50 mg twice a day and stop the

evening, the night time dose.  Also, we will start the patient on Depakote 250

mg twice a day and Zyprexa 10 mg at bedtime and continue to follow up closely.





DD: 05/26/2019 21:08

DT: 05/27/2019 09:45

JOB# [id number]  [id number]

## 2019-05-27 NOTE — PROGRESS NOTES
DATE:  05/27/2019



PSYCHIATRIC PROGRESS NOTE



SUBJECTIVE:  Chart was reviewed and the patient interviewed.  Also discussed the

patient's condition with the staff and reviewed records and labs.  The patient

seems to be calmer and seems to be less agitated since I changed his medications

yesterday.  The patient also is slightly easier to redirect him.  The patient is

still having episodes of agitation and irritability, but less than before. 

Otherwise, the patient is compliant with taking his medications.



ASSESSMENT:  The patient seems to be slightly calmer, but is still psychotic.



TREATMENT PLAN:  Continue to monitor his behavior and his condition closely

because the patient still can be high risk, dangerous to self, and is still

psychotic.  Continue to follow up his medications and his condition closely.





DD: 05/27/2019 16:38

DT: 05/27/2019 19:51

[facility]# [id number]  [id number]

## 2019-05-27 NOTE — PROGRESS NOTES
DATE:  05/27/2019



IDENTIFICATION:  A 69-year-old male.



SUBJECTIVE:  The patient seen and examined.  The patient is lying in the bed. 

The patient does not provide any meaningful history.



PHYSICAL EXAMINATION:

VITAL SIGNS:  Temperature 98, pulse is 80, respiratory rate 18, blood pressure

120/67.

HEENT:  No evidence of facial asymmetry.  Multiple absent teeth noted.

NECK:  Supple, no JVD.

HEART:  Regular.

CHEST:  Lungs equal in expansion with no expiratory wheezing.

ABDOMEN:  Soft.

EXTREMITIES:  No edema.

NEUROLOGIC:  Alert, awake, following commands.



CLINICAL IMPRESSION:

1.  Hypertension.

2.  Hypothyroidism.

3.  Allergic rhinosinusitis.

4.  Degenerative joint disease.

5.  Psychotic disorder.

6.  Developmental disability.



PLAN:

1.  Fall precautions.

2.  Nutritional support.

3.  Synthroid.

4.  Antihypertensive medication.

5.  Monitor blood pressure.

6.  Psychotic evaluation and management deferred to psychiatrist.

7.  General nursing care.

8.  Continue Claritin and Flonase.

9.  We will continue to follow this patient during the stay in the hospital.





DD: 05/27/2019 17:46

DT: 05/27/2019 20:16

JOB# [id number]  [id number]

## 2019-05-28 RX ADMIN — FLUTICASONE PROPIONATE SCH SPR: 50 SPRAY, METERED NASAL at 08:10

## 2019-05-28 RX ADMIN — LEVOTHYROXINE SODIUM SCH MG: 75 TABLET ORAL at 06:36

## 2019-05-28 RX ADMIN — OLANZAPINE SCH MG: 5 TABLET, ORALLY DISINTEGRATING ORAL at 20:45

## 2019-05-29 RX ADMIN — LEVOTHYROXINE SODIUM SCH MG: 75 TABLET ORAL at 06:32

## 2019-05-29 RX ADMIN — OLANZAPINE SCH MG: 5 TABLET, ORALLY DISINTEGRATING ORAL at 20:05

## 2019-05-29 RX ADMIN — FLUTICASONE PROPIONATE SCH: 50 SPRAY, METERED NASAL at 15:25

## 2019-05-29 NOTE — PROGRESS NOTES
DATE:  05/28/2019



Case was discussed with staff of the patient and reviewed records.  I am

covering for Dr. Coyle.  This is a 69-year-old male, who was admitted on

05/24/2019 from [facility]. 

The patient has been very agitated, aggressive, irritable, hitting himself, and

all the time, has been talking constantly nonsensical, entering patient's room. 

The patient is developmentally disabled.  He continues to be unable to make safe

plan for his self-care or participate in meaningful conversation.  He cannot

express himself.  His medication has been adjusted and he is starting to be a

little bit calmer and less psychotic, though he is still unable to express

himself and that makes him very frustrated.  He is on Depakote 250 mg twice a

day, olanzapine 10 mg at bedtime, Seroquel 50 mg twice a day with no side

effects, no sedation, no nausea, and no extrapyramidal symptoms.  We will

continue to work with the patient in group therapy and milieu therapy and adjust

the medications as needed.





DD: 05/28/2019 11:59

DT: 05/29/2019 02:35

JOB# [id number]  [id number]

## 2019-05-30 RX ADMIN — OLANZAPINE SCH MG: 5 TABLET, ORALLY DISINTEGRATING ORAL at 20:55

## 2019-05-30 RX ADMIN — LEVOTHYROXINE SODIUM SCH MG: 75 TABLET ORAL at 06:29

## 2019-05-30 RX ADMIN — FLUTICASONE PROPIONATE SCH: 50 SPRAY, METERED NASAL at 08:52

## 2019-05-30 NOTE — PROGRESS NOTES
DATE:  05/29/2019



PSYCHIATRIC PROGRESS NOTE



SUBJECTIVE:  Chart reviewed and the patient interviewed.  Also discussed the

patient's condition with the staff and reviewed records and labs.  The patient

still has episodes of agitation and hitting himself on his head, but seems to be

slightly less than before.  The patient also is still easily agitated and

anxious and irritable.  He also still needs close monitoring because of his

self-destructive behavior.  He also still have mood swings and at times calm and

sometimes agitated.  On the other hand, easier to redirect him.  The patient

also is compliant with taking medication.  He has no side effects of

medications.



ASSESSMENT:  The patient is still agitated and is still exhibiting

self-destructive behavior.



TREATMENT PLAN:  We will continue monitoring his behavior and his condition

closely.  Also, continue to adjust psychotropic medications.  Also, we will get

Depakote blood level in a couple of days.





DD: 05/29/2019 16:02

DT: 05/30/2019 10:19

[facility]# [id number]  [id number]

## 2019-05-31 RX ADMIN — LEVOTHYROXINE SODIUM SCH MG: 75 TABLET ORAL at 06:29

## 2019-05-31 RX ADMIN — OLANZAPINE SCH MG: 5 TABLET, ORALLY DISINTEGRATING ORAL at 20:38

## 2019-05-31 RX ADMIN — FLUTICASONE PROPIONATE SCH: 50 SPRAY, METERED NASAL at 08:43

## 2019-05-31 NOTE — PROGRESS NOTES
DATE:  05/30/2019



IDENTIFICATION:  A 69-year-old male.



SUBJECTIVE:  The patient seen and examined.  The patient is lying in the bed. 

No new event.  The patient is developmentally delayed, unable to get meaningful

history.  Nurse's note has been reviewed.



PHYSICAL EXAMINATION:

VITAL SIGNS:  Temperature 97.9, pulse is 82, respiratory rate 18, and blood

pressure 140/80.

HEENT:  Remarkable for multiple absent dentition.

NECK:  Supple, no JVD.

HEART:  Regular.

LUNGS:  Clear to auscultation.

ABDOMEN:  Soft.  No guarding, no rigidity.  Bowel sounds present.  No palpable

mass.

EXTREMITIES:  No edema.



CLINICAL IMPRESSION:

1.  Hypertension.

2.  Psychotic disorder.

3.  Hypothyroidism.

4.  Allergic rhinosinusitis.

5.  Degenerative joint disease.

6.  Developmental delay.

7.  Fall risk.

8.  Methicillin-resistant Staphylococcus aureus colonization.



PLAN:

1.  Monitor blood pressure.

2.  Antihypertensive medicine.

3.  Synthroid.

4.  Psychiatric evaluation and management deferred to psychiatrist.

5.  Continue Claritin and Flonase for allergic rhinitis.

6.  General nursing care.

7.  Symptoms management.

8.  Monitor blood pressure.

9.  Continue current treatment plan.

10.  Care plan reviewed and discussed with staff.





DD: 05/30/2019 09:32

DT: 05/31/2019 03:59

JOB# [id number]  [id number]

## 2019-05-31 NOTE — PROGRESS NOTES
DATE:  05/30/2019



SUBJECTIVE:  Chart was reviewed and the patient interviewed.  Also discussed the

patient's condition with the staff and reviewed records and labs.  The patient

is still extremely agitated and confused and today the patient was wandering

around the unit, interrupting other patients and also interrupting staff and

yelling and screaming constantly.  The patient also grabbed my hand and tried to

hit his head with my hand and I have to stop him from doing that.  He is still

confused and he is still easily agitated and irritable mood.  He also still has

difficulty following directions because of his mental ability.  Otherwise, the

patient is compliant with taking her medications with no side effects of

medications.



ASSESSMENT:  The patient is still agitated and aggressive and can be dangerous

to others.



TREATMENT PLAN:  Continue to monitor his behavior and his condition closely. 

Also, continue adjusting psychotropic medications and work on behavioral

modification.  Also, we will increase Seroquel to 50 mg 3 times a day.





DD: 05/30/2019 18:41

DT: 05/31/2019 16:56

[facility]# [id number]  [id number]

## 2019-06-01 RX ADMIN — OLANZAPINE SCH MG: 5 TABLET, ORALLY DISINTEGRATING ORAL at 21:21

## 2019-06-01 RX ADMIN — LEVOTHYROXINE SODIUM SCH MG: 75 TABLET ORAL at 06:40

## 2019-06-01 RX ADMIN — FLUTICASONE PROPIONATE SCH: 50 SPRAY, METERED NASAL at 08:19

## 2019-06-01 NOTE — PROGRESS NOTES
DATE:  05/31/2019



SUBJECTIVE:  Chart was reviewed and the patient interviewed.  Also discussed the

patient's condition with the staff and reviewed records and labs.  The patient

is still easily agitated and he is still running up and down the hallway and

have difficulty sleeping at night.  The patient also is still preoccupied.  The

patient also is still confused and suspicious and paranoid.  Otherwise, the

patient continued to take Depakote 250 mg twice a day and Seroquel increased to

50 mg 3 times a day and Zyprexa 10 mg at bedtime.  The patient also continued to

take Klonopin 1 mg twice a day.  The patient is still agitated and is still

psychotic and needs close monitoring.



TREATMENT PLAN:  Continue to monitor his behavior and his condition closely. 

Also, continue adjusting psychotropic medications.  Depakote blood level came

back to be 29.6, which is subtherapeutic.  We will continue current medications,

but also we will increase Depakote to 500 mg twice a day and we will continue to

follow up.





DD: 05/31/2019 19:04

DT: 06/01/2019 05:42

JOB# [id number]  [id number]

## 2019-06-01 NOTE — PROGRESS NOTES
DATE:  06/01/2019



Covering for Dr. Coyle.



Case was discussed with staff of the patient, reviewed records.  The patient

____ seeing before covering for Dr. Coyle.  The patient continues to be easily

agitated, running up and down the hallway.  He was at the nurses' station today

when I came and he was very loud, aggressive, threatening, though he cannot

talk, but he was trying to attack staff, continuing preoccupied, confused,

suspicious and paranoid.  He is compliant with the medication with no side

effects, no sedation or nausea, no extrapyramidal symptoms.  He had to be given

Haldol, Ativan, and Benadryl because of his extreme agitation.  Dr. Coyle

increased his Depakote today to 500 mg twice a day and I ordered for him a stat

Haldol.  No side effects to the medication, no sedation, no nausea, no

extrapyramidal symptoms.  His Seroquel was increased to 50 mg 3 times a day 2

days ago and he is on olanzapine 10 mg at bedtime, which I will be increasing to

50 mg at bedtime and no side effects with the medication, no sedation, no

nausea, no extrapyramidal symptoms.  We will continue to work with the patient

in group therapy, milieu therapy, and adjust the medication as needed.





DD: 06/01/2019 11:59

DT: 06/01/2019 23:50

JOB# [id number]  [id number]

## 2019-06-02 RX ADMIN — OLANZAPINE SCH MG: 5 TABLET, ORALLY DISINTEGRATING ORAL at 21:40

## 2019-06-02 RX ADMIN — LEVOTHYROXINE SODIUM SCH MG: 75 TABLET ORAL at 06:33

## 2019-06-02 RX ADMIN — FLUTICASONE PROPIONATE SCH: 50 SPRAY, METERED NASAL at 08:28

## 2019-06-02 NOTE — PROGRESS NOTES
DATE:  06/02/2019



SUBJECTIVE:  Case was discussed with staff of the patient, reviewed records. 

The patient continues to be unpredictable, impulsive, ____ in meds he has had

yesterday.  Continues to have poor insight.  He continues to be unpredictable,

impulsive, needing redirection.  He had to be medicated yesterday.  No side

effects of the medication, no sedation, no nausea, no extrapyramidal symptoms. 

I increased the olanzapine to 15 mg at bedtime with no side effects, no

sedation, no nausea, and no extrapyramidal symptoms.  He was also on Seroquel 50

mg 3 times a day.  We will continue the patient in group therapy, milieu

therapy, and adjust medication as needed.





DD: 06/02/2019 12:16

DT: 06/02/2019 23:47

JOB# [id number]  [id number]

## 2019-06-03 RX ADMIN — FLUTICASONE PROPIONATE SCH: 50 SPRAY, METERED NASAL at 09:00

## 2019-06-03 RX ADMIN — LEVOTHYROXINE SODIUM SCH MG: 75 TABLET ORAL at 06:48

## 2019-06-03 RX ADMIN — OLANZAPINE SCH MG: 5 TABLET, ORALLY DISINTEGRATING ORAL at 21:00

## 2019-06-04 RX ADMIN — FLUTICASONE PROPIONATE SCH: 50 SPRAY, METERED NASAL at 13:22

## 2019-06-04 RX ADMIN — LEVOTHYROXINE SODIUM SCH MG: 75 TABLET ORAL at 06:30

## 2019-06-04 RX ADMIN — OLANZAPINE SCH MG: 5 TABLET, ORALLY DISINTEGRATING ORAL at 20:35

## 2019-06-04 NOTE — PROGRESS NOTES
DATE:  06/04/2019



PSYCHIATRIC PROGRESS NOTE



SUBJECTIVE:  Chart reviewed and the patient interviewed.  Also discussed the

patient's condition with the staff and reviewed records and labs.  The patient

seems to be slightly calmer today, but he is still confused and he is still

actively hallucinating.  The patient also is still restless and is still

wandering around, but slightly easier to redirect him.  The patient denies any

intention to harm himself or others, but at the same time, he is actually

confused and he is just acting bizarre.



ASSESSMENT:  The patient is still confused and considered to be gravely

disabled, but calmer than before.



TREATMENT PLAN:  Continue to monitor his behavior and condition closely.  Also,

continue adjusting psychotropic medications.  Also, we will get a Tegretol blood

level today and we will continue to follow up.





DD: 06/04/2019 06:37

DT: 06/04/2019 11:48

JOB# [id number]  [id number]

## 2019-06-04 NOTE — PROGRESS NOTES
DATE:  06/03/2019



PSYCHIATRIC PROGRESS NOTE



SUBJECTIVE:  Chart reviewed and the patient interviewed.  Also discussed the

patient's condition with the staff and reviewed records and labs.  The patient

is still extremely agitated and he is still delusional and he is still in angry

and irritable mood.  The patient also is still tried to grab people and hitting

himself.  The patient also still needs lots of redirections.  The patient also

still has difficulty following any directions.  Otherwise, the patient continued

to take Seroquel, Zyprexa and Depakote with no side effects.



ASSESSMENT:  The patient is still agitated and is still in irritable mood.



TREATMENT PLAN:  Continue to monitor behavior and condition closely.  Also,

continue adjusting psychotropic medications and working on behavioral

modification.





DD: 06/03/2019 20:45

DT: 06/04/2019 10:09

[facility]# [id number]  [id number]

## 2019-06-05 RX ADMIN — OLANZAPINE SCH MG: 5 TABLET, ORALLY DISINTEGRATING ORAL at 21:14

## 2019-06-05 RX ADMIN — FLUTICASONE PROPIONATE SCH: 50 SPRAY, METERED NASAL at 08:24

## 2019-06-05 RX ADMIN — LEVOTHYROXINE SODIUM SCH MG: 75 TABLET ORAL at 06:33

## 2019-06-05 NOTE — PROGRESS NOTES
DATE:  06/05/2019



PSYCHIATRIC PROGRESS NOTE



SUBJECTIVE:  Chart reviewed and the patient interviewed.  Also discussed the

patient's condition with the staff and reviewed records and labs.  The patient

is still confused and anxious.  The patient also is still wandering into other

patients' rooms.  Also, is still having episodes of agitation.  On the other

hand, decreased hitting himself and he is not as agitated and easier to redirect

him.  The patient also is compliant with taking medications and seems to be more

cooperative with his treatment.



ASSESSMENT:  The patient is still psychotic, but less confused.



TREATMENT PLAN:  Continue monitoring his behavior and condition.  Also, continue

adjusting psychotropic medications.  Also, planning to contact the 

of the group home where he lives to plan for his discharge hopefully tomorrow.





DD: 06/05/2019 07:31

DT: 06/05/2019 10:39

[facility]# [id number]  [id number]

## 2019-06-06 RX ADMIN — FLUTICASONE PROPIONATE SCH: 50 SPRAY, METERED NASAL at 09:08

## 2019-06-06 RX ADMIN — LEVOTHYROXINE SODIUM SCH MG: 75 TABLET ORAL at 06:30

## 2019-06-06 RX ADMIN — OLANZAPINE SCH MG: 5 TABLET, ORALLY DISINTEGRATING ORAL at 21:31

## 2019-06-06 NOTE — PROGRESS NOTES
DATE:  06/06/2019



SUBJECTIVE:  Chart reviewed and patient interviewed.  Also discussed the

patient's condition with the staff and reviewed records and labs.  The patient

is still actively responding to internal stimuli and talking to himself.  The

patient also is still agitated and restless and has disorganized thoughts, but

on the other hand, the patient does not hit himself like before and seems that

he is less agitated.  The patient also is compliant with taking his medications

with no side effects of medications.



I spoke with patient's home manager at the group home where he lives and she

will come to interview the patient today, hopefully it is planned that he will

be discharged tomorrow.



ASSESSMENT:  The patient is less agitated.



TREATMENT PLAN:  Continue to monitor his behavior and his condition closely. 

Also, continue adjusting his medications and we will continue to follow up.





DD: 06/06/2019 20:18

DT: 06/06/2019 22:46

JOB# [id number]  [id number]

## 2019-06-07 RX ADMIN — LEVOTHYROXINE SODIUM SCH MG: 75 TABLET ORAL at 06:48

## 2019-06-07 RX ADMIN — FLUTICASONE PROPIONATE SCH: 50 SPRAY, METERED NASAL at 08:23

## 2019-06-08 NOTE — DISCHARGE SUMMARY
DATE OF DISCHARGE:  06/07/2019



PATIENT'S AGE: 69.



SEX:  Male.



PHYSICIAN:  Dr. Coyle



PRIMARY DIAGNOSIS:  Unspecified psychosis.



SECONDARY DIAGNOSIS:  Developmental disability, moderate to severe, with

psychotic features and behavioral disturbances.



REASON FOR HOSPITALIZATION:  The patient was admitted to the hospital because of

increased agitation and irritability in the group home where he lives at.  The

patient was hitting himself strongly in his head and face and was not able to

follow any directions and I tried to do some adjustment to his medications ____

with no success.



HOSPITAL COURSE:  The patient continued to be agitated and continued to be ____

hitting himself strongly and was easily irritable and was not able to follow

directions.  The patient was given Seroquel and the dose adjusted to 50 mg 3

times a day and Zyprexa 15 mg at bedtime.  That helped the patient to slightly

start to be calmer and to be less irritable and less agitated.  The patient also

was given Depakote in the dose of 500 mg twice a day and Klonopin 1 mg twice a

day.  The patient was not as agitated.  The patient also was interacting more. 

He was not hitting himself as before.  The patient was discharged from the

hospital.



Physical exam of the patient showed no major medical problems.  The patient was

monitored closely for any medical problems by Dr. Sidhu.



AFTER DISCHARGE PLANS:  The patient discharged from the hospital and returned to

his group home with plans for outpatient treatment there.



EXPECTED OUTCOME AFTER DISCHARGE:  Fair if the patient continues his

psychotropic medications and follow up with discharge plans.





DD: 06/07/2019 12:43

DT: 06/07/2019 14:46

[facility]# [id number]  [id number]

## 2021-11-29 NOTE — ED PHYSICIAN CHART
ED Chief Complaint/HPI





- Patient Information


Date Seen:: 05/06/18


Time Seen:: 15:17


Chief Complaint:: Pt has been noticed to have increased agitation since about 8 

am today.


History of Present Illness:: 





Brought in by caregiver Karlos as well as  Mr. Rizo because pt 

was noticed to have increased agitation since about 8 am today. Pt at times 

hits his head with his hands, but no apparent injury has been noticed. Pt has h/

o dementia and is essentially nonverbal. Pt does not cooperate. H & P are thus 

limited. Info is primarily from caregiver.


Allergies:: 


 Allergies











Allergy/AdvReac Type Severity Reaction Status Date / Time


 


No Known Allergies Allergy   Verified 05/06/18 15:09











Vitals:: 


 Vital Signs - 8 hr











  05/06/18





  15:09


 


Temp 98.4 F


 


HR 89


 


RR 17


 


/81


 


O2 Sat % 97











Historian:: Medical Records, Other (caregiver Karlos)


Family MD/PCP:: 





Dr. Alejo


LMP:: 





N/A


Review:: Nurse's Note Reviewed





ED Review of Systems





- Review of Systems


General/Constitutional: No fever, Other (Pt does not cooperate for ROS)


Skin: No skin lesions, No rash, No bruising


Pulmonary: No SOB


GI: No nausea, No vomiting, No diarrhea, No pain





ED Past Medical History





- Past Medical History


Past Medical History: HTN, Thyroid disorder, Dementia


Family History: Other (Pt does not cooperate to provide info on FHx.)


Social History: Care Facility


Surgical History: other (Pt does not cooperate to provide info on Surgical Hx.)


Psychiatricy History: Dementia


Medication: Reviewed





Family Medical History





- Family Member


  ** Mother


History Unknown: Yes


Ethnicity: Non-


Other Medical History: Pt non-verbal.  Unable to obtain family history.





ED Physical Exam





- Physical Examination


General/Constitutional: Awake, Well-developed, well-nourished, Alert, No 

distress


Other Gen/Cons comments:: 





Breathes comfortably. Pt is uncooperative and at times becomes agitated. Pt 

does not follow instructions and repeatedly attempts to climb out of gurney.


Head: Atraumatic


Eyes: Lids, conjuctiva normal, PERRL, EOMI


Skin: No rash, No ecchymosis, Well hydrated, No lymphadenopathy


ENMT: External ears, nose nl, TM canals nl, Nasal exam nl, Oropharynx nl, 

Tonsils nl


Other ENMT comments:: 





Poor dentition with multiple missing teeth.


Neck: Nontender, Full ROM w/o pain, No JVD, No nuchal rigidity, No mass, No 

stridor


Respiratory: Nl effort/Exclusion, Clear to Auscultation, No Wheeze/Rhonchi/Rales


Cardio Vascular: RRR, No murmur, gallop, rubs


GI: No tenderness/rebounding/guarding, No organomegaly, No hernia, Normal BS's, 

Nondistended, No McBurney tenderness


Other GI comments:: 





Abdomen is soft.


Extremities: No tenderness or effusion, Full ROM, normal strength in all 

extremities, No edema


Other Neuro/Psych comments:: 





alert and responsive to voice and tactile stimuli. Pt is nonverbal. Spontaneous 

movements noticed in all 4 extremities. Pt does not cooperate for full 

neurological exam.


Misc: Normal back, No paraspinal tenderness





ED Labs/Radiology/EKG Results





- Lab Results


Results: 





 Laboratory Tests











  05/06/18 05/06/18 05/06/18





  15:49 15:49 15:49


 


WBC  10.2  


 


RBC  4.92  


 


Hgb  14.5  


 


Hct  43.1  


 


MCV  87.6  


 


MCH  29.4  


 


MCHC Differential  33.6  


 


RDW  12.9  


 


Plt Count  339  


 


MPV  8.0  


 


Neutrophils %  70.0  


 


Lymphocytes %  22.0  


 


Monocytes %  6.5  


 


Eosinophils %  0.9  


 


Basophils %  0.6  


 


PT   9.9 


 


INR   0.95 


 


PTT (Actin FS)   23.6 L 


 


Sodium    131 L


 


Potassium    3.3 L


 


Chloride    100


 


Carbon Dioxide    21.4


 


Anion Gap    12.9


 


BUN    13


 


Creatinine    0.9


 


Est GFR ( Amer)    > 60.0


 


Est GFR (Non-Af Amer)    > 60.0


 


BUN/Creatinine Ratio    14.4


 


Glucose    107 H


 


Calcium    9.2


 


Total Bilirubin    0.5


 


AST    12 L


 


ALT    12


 


Alkaline Phosphatase    112 H


 


Troponin I   


 


Total Protein    6.6


 


Albumin    4.1 L


 


Globulin    2.5


 


Albumin/Globulin Ratio    1.6


 


Urine Source   


 


Urine Color   


 


Urine Clarity   


 


Urine pH   


 


Ur Specific Gravity   


 


Urine Protein   


 


Urine Glucose (UA)   


 


Urine Ketones   


 


Urine Blood   


 


Urine Nitrate   


 


Urine Bilirubin   


 


Urine Urobilinogen   


 


Ur Leukocyte Esterase   


 


Urine RBC   


 


Urine WBC   


 


Ur Epithelial Cells   


 


Urine Bacteria   














  05/06/18 05/06/18





  15:49 16:15


 


WBC  


 


RBC  


 


Hgb  


 


Hct  


 


MCV  


 


MCH  


 


MCHC Differential  


 


RDW  


 


Plt Count  


 


MPV  


 


Neutrophils %  


 


Lymphocytes %  


 


Monocytes %  


 


Eosinophils %  


 


Basophils %  


 


PT  


 


INR  


 


PTT (Actin FS)  


 


Sodium  


 


Potassium  


 


Chloride  


 


Carbon Dioxide  


 


Anion Gap  


 


BUN  


 


Creatinine  


 


Est GFR ( Amer)  


 


Est GFR (Non-Af Amer)  


 


BUN/Creatinine Ratio  


 


Glucose  


 


Calcium  


 


Total Bilirubin  


 


AST  


 


ALT  


 


Alkaline Phosphatase  


 


Troponin I  < 0.01 L 


 


Total Protein  


 


Albumin  


 


Globulin  


 


Albumin/Globulin Ratio  


 


Urine Source   CATH


 


Urine Color   STRAW


 


Urine Clarity   CLEAR


 


Urine pH   6.0


 


Ur Specific Gravity   <= 1.005


 


Urine Protein   NEGATIVE


 


Urine Glucose (UA)   NEGATIVE


 


Urine Ketones   NEGATIVE


 


Urine Blood   TRACE


 


Urine Nitrate   NEGATIVE


 


Urine Bilirubin   NEGATIVE


 


Urine Urobilinogen   0.2


 


Ur Leukocyte Esterase   NEGATIVE


 


Urine RBC   2-5 H


 


Urine WBC   NONE SEEN


 


Ur Epithelial Cells   NONE SEEN


 


Urine Bacteria   NONE SEEN














- EKG Interpretations


EKG Time:: 15:52


Rate & Rhythm: NSR with VR 91


Comments:: 





LAE, LBBB, NSSTT changes.





ED Septic Shock





- .


Is Septic Shock (SBP<90, OR Lactate>4 mmol\L) present?: No





- <6hrs of presentation:


Vital Signs: 


 Vital Signs - 8 hr











  05/06/18





  15:09


 


Temp 98.4 F


 


HR 89


 


RR 17


 


/81


 


O2 Sat % 97














ED Reassessment (Disposition)





- Reassessment


Reassessment:: 





1715 Pt is now calm. Remaining lab results just became available. Pt has been 

cleared medically.





1720 Nursing staff related that Dr. Coyle called and already decided to admit 

pt to Geropsych Unit, and Dr. Sidhu is the in house medical physician for the 

patient.


Reassessment Condition:: Improved





- Diagnosis


Diagnosis:: 





Dementia with increased agitation.


Mild hypokalemia.





- Patient Disposition


Admitted to:: [facility]


Admitting Medical Physician:: Kamran Sidhu


Admitting Psych Physician:: Abner Coyle


Time:: 17:20


Condition at Disposition:: Improved





ED Discharge Plan





- Patient Disposition


Admit/Discharge/Transfer: Acute Care w/in this hosp Dr. Shanae Garcia (Onc) [phone], Dr. Isidro Daniels Oncologist  [phone]

## 2021-12-11 NOTE — PROGRESS NOTES
DATE:  05/19/2018



SUBJECTIVE:  Case discussed with staff of the patient, reviewed records. 

Covering for Dr. Coyle.  This is a well-known case, I have seen him last week

covering for Dr. Coyle.  The patient continues to be agitated, irritable, angry.

 Continues to grab items form other patients, from staff and needing

redirection, getting upset, underwood, unable to follow directions, very confused,

needs close monitoring.  He is a high fall risk because of his dementia and his

medication that could make him sedated.  He is sleeping and eating better.  He

is on Seroquel 50 mg in the morning and 100 mg at bedtime and he is on

medication for high blood pressure, amlodipine 5 mg daily with Cogentin 2 mg

daily and he is on albuterol inhaler 2.5 mg every 4 hours as needed and he is on

Allegra 60 mg every 12 hours.  Ibuprofen as needed every 8 hours 600 mg,

fluticasone propionate 2 sprays nasally daily, levothyroxine 0.15 mg every other

day, multivitamin 1 tablet daily and Diovan 160 mg daily, Ambien 5 mg at bedtime

as needed for lack of sleep.  The patient with no side effects to the

medication, no sedation, no nausea, no extrapyramidal symptoms.  We will

continue to work with the patient in group therapy and milieu therapy and adjust

the medications as needed.





DD: 05/19/2018 12:30

DT: 05/19/2018 21:09

JOB# [id number]  [id number] Constitutional: No acute distress, well appearing, alert and active  Eyes: PERRLA, no conjunctival injection, no eye discharge, EOMI  ENMT: No nasal congestion, no nasal discharge, normal oropharynx, no exudates, no sores,  clear TMS bilateral.   Neck: Supple, no lymphadenopathy  Respiratory: Clear lung sounds bilateral, no wheeze, crackle or rhonchi  Cardiovascular: S1, S2, no murmur, RRR  Gastrointestinal: Bowel sounds positive, Soft, nondistended, nontender  Skin: No rash or bruising  Musculoskeletal: +TTP of left chest wall